# Patient Record
Sex: FEMALE | Race: BLACK OR AFRICAN AMERICAN | NOT HISPANIC OR LATINO | Employment: OTHER | URBAN - METROPOLITAN AREA
[De-identification: names, ages, dates, MRNs, and addresses within clinical notes are randomized per-mention and may not be internally consistent; named-entity substitution may affect disease eponyms.]

---

## 2023-02-11 PROBLEM — B02.9 SHINGLES: Status: ACTIVE | Noted: 2023-02-11

## 2023-02-11 PROBLEM — R60.0 PEDAL EDEMA: Status: ACTIVE | Noted: 2023-02-11

## 2023-02-11 PROBLEM — E66.01 OBESITY, MORBID (MORE THAN 100 LBS OVER IDEAL WEIGHT OR BMI > 40) (MULTI): Status: ACTIVE | Noted: 2023-02-11

## 2023-02-11 PROBLEM — M19.90 OSTEOARTHROSIS: Status: ACTIVE | Noted: 2023-02-11

## 2023-02-11 PROBLEM — E87.6 HYPOKALEMIA: Status: ACTIVE | Noted: 2023-02-11

## 2023-02-11 PROBLEM — D12.6 TUBULAR ADENOMA OF COLON: Status: ACTIVE | Noted: 2023-02-11

## 2023-02-11 PROBLEM — I10 HYPERTENSION: Status: ACTIVE | Noted: 2023-02-11

## 2023-02-11 PROBLEM — J30.2 SEASONAL ALLERGIES: Status: ACTIVE | Noted: 2023-02-11

## 2023-02-11 PROBLEM — M25.511 RIGHT SHOULDER PAIN: Status: ACTIVE | Noted: 2023-02-11

## 2023-02-11 PROBLEM — R47.01 EXPRESSIVE APHASIA: Status: ACTIVE | Noted: 2023-02-11

## 2023-02-11 PROBLEM — I63.9 CVA (CEREBROVASCULAR ACCIDENT) (MULTI): Status: ACTIVE | Noted: 2023-02-11

## 2023-02-11 PROBLEM — M75.100 TORN ROTATOR CUFF: Status: ACTIVE | Noted: 2023-02-11

## 2023-02-11 PROBLEM — R52 PAIN MANAGEMENT: Status: ACTIVE | Noted: 2023-02-11

## 2023-02-11 PROBLEM — E78.5 DYSLIPIDEMIA: Status: ACTIVE | Noted: 2023-02-11

## 2023-02-11 PROBLEM — R79.89 ABNORMAL TSH: Status: ACTIVE | Noted: 2023-02-11

## 2023-02-11 PROBLEM — I48.19 PERSISTENT ATRIAL FIBRILLATION (MULTI): Status: ACTIVE | Noted: 2023-02-11

## 2023-02-11 PROBLEM — H04.123 DRY EYES: Status: ACTIVE | Noted: 2023-02-11

## 2023-02-11 RX ORDER — ATORVASTATIN CALCIUM 80 MG/1
1 TABLET, FILM COATED ORAL NIGHTLY
COMMUNITY
Start: 2019-01-04 | End: 2023-03-10 | Stop reason: SDUPTHER

## 2023-02-11 RX ORDER — DILTIAZEM HYDROCHLORIDE 120 MG/1
1 CAPSULE, EXTENDED RELEASE ORAL DAILY
COMMUNITY
Start: 2020-10-06 | End: 2023-03-10 | Stop reason: SDUPTHER

## 2023-02-11 RX ORDER — MULTIVIT-MIN/FA/LYCOPEN/LUTEIN .4-300-25
1 TABLET ORAL DAILY
COMMUNITY

## 2023-02-11 RX ORDER — SPIRONOLACTONE 25 MG/1
1.5 TABLET ORAL DAILY
COMMUNITY
Start: 2020-03-12 | End: 2023-03-10 | Stop reason: SDUPTHER

## 2023-02-11 RX ORDER — CHOLECALCIFEROL (VITAMIN D3) 25 MCG
1 TABLET ORAL DAILY
COMMUNITY
Start: 2022-07-20

## 2023-03-04 LAB
ALANINE AMINOTRANSFERASE (SGPT) (U/L) IN SER/PLAS: 28 U/L (ref 7–45)
ALBUMIN (G/DL) IN SER/PLAS: 3.8 G/DL (ref 3.4–5)
ALKALINE PHOSPHATASE (U/L) IN SER/PLAS: 65 U/L (ref 33–136)
ANION GAP IN SER/PLAS: 12 MMOL/L (ref 10–20)
ASPARTATE AMINOTRANSFERASE (SGOT) (U/L) IN SER/PLAS: 23 U/L (ref 9–39)
BILIRUBIN TOTAL (MG/DL) IN SER/PLAS: 0.7 MG/DL (ref 0–1.2)
CALCIUM (MG/DL) IN SER/PLAS: 9.5 MG/DL (ref 8.6–10.3)
CARBON DIOXIDE, TOTAL (MMOL/L) IN SER/PLAS: 32 MMOL/L (ref 21–32)
CHLORIDE (MMOL/L) IN SER/PLAS: 99 MMOL/L (ref 98–107)
CHOLESTEROL (MG/DL) IN SER/PLAS: 134 MG/DL (ref 0–199)
CHOLESTEROL IN HDL (MG/DL) IN SER/PLAS: 44.3 MG/DL
CHOLESTEROL/HDL RATIO: 3
CREATININE (MG/DL) IN SER/PLAS: 1.03 MG/DL (ref 0.5–1.05)
ERYTHROCYTE DISTRIBUTION WIDTH (RATIO) BY AUTOMATED COUNT: 13.5 % (ref 11.5–14.5)
ERYTHROCYTE MEAN CORPUSCULAR HEMOGLOBIN CONCENTRATION (G/DL) BY AUTOMATED: 31.3 G/DL (ref 32–36)
ERYTHROCYTE MEAN CORPUSCULAR VOLUME (FL) BY AUTOMATED COUNT: 84 FL (ref 80–100)
ERYTHROCYTES (10*6/UL) IN BLOOD BY AUTOMATED COUNT: 5 X10E12/L (ref 4–5.2)
GFR FEMALE: 58 ML/MIN/1.73M2
GLUCOSE (MG/DL) IN SER/PLAS: 84 MG/DL (ref 74–99)
HEMATOCRIT (%) IN BLOOD BY AUTOMATED COUNT: 41.9 % (ref 36–46)
HEMOGLOBIN (G/DL) IN BLOOD: 13.1 G/DL (ref 12–16)
LDL: 78 MG/DL (ref 0–99)
LEUKOCYTES (10*3/UL) IN BLOOD BY AUTOMATED COUNT: 7.2 X10E9/L (ref 4.4–11.3)
PLATELETS (10*3/UL) IN BLOOD AUTOMATED COUNT: 321 X10E9/L (ref 150–450)
POTASSIUM (MMOL/L) IN SER/PLAS: 4.6 MMOL/L (ref 3.5–5.3)
PROTEIN TOTAL: 7.6 G/DL (ref 6.4–8.2)
SODIUM (MMOL/L) IN SER/PLAS: 138 MMOL/L (ref 136–145)
THYROTROPIN (MIU/L) IN SER/PLAS BY DETECTION LIMIT <= 0.05 MIU/L: 0.66 MIU/L (ref 0.44–3.98)
TRIGLYCERIDE (MG/DL) IN SER/PLAS: 57 MG/DL (ref 0–149)
UREA NITROGEN (MG/DL) IN SER/PLAS: 10 MG/DL (ref 6–23)
VLDL: 11 MG/DL (ref 0–40)

## 2023-03-10 ENCOUNTER — OFFICE VISIT (OUTPATIENT)
Dept: PRIMARY CARE | Facility: CLINIC | Age: 73
End: 2023-03-10
Payer: MEDICARE

## 2023-03-10 VITALS
TEMPERATURE: 98.3 F | WEIGHT: 203 LBS | DIASTOLIC BLOOD PRESSURE: 70 MMHG | RESPIRATION RATE: 16 BRPM | SYSTOLIC BLOOD PRESSURE: 130 MMHG | BODY MASS INDEX: 41 KG/M2 | HEART RATE: 72 BPM

## 2023-03-10 DIAGNOSIS — I63.9 CEREBROVASCULAR ACCIDENT (CVA), UNSPECIFIED MECHANISM (MULTI): Primary | ICD-10-CM

## 2023-03-10 DIAGNOSIS — E78.5 DYSLIPIDEMIA: ICD-10-CM

## 2023-03-10 DIAGNOSIS — E66.01 OBESITY, MORBID (MORE THAN 100 LBS OVER IDEAL WEIGHT OR BMI > 40) (MULTI): ICD-10-CM

## 2023-03-10 DIAGNOSIS — I10 PRIMARY HYPERTENSION: ICD-10-CM

## 2023-03-10 DIAGNOSIS — I48.19 PERSISTENT ATRIAL FIBRILLATION (MULTI): ICD-10-CM

## 2023-03-10 DIAGNOSIS — Z12.11 COLON CANCER SCREENING: ICD-10-CM

## 2023-03-10 PROCEDURE — 1159F MED LIST DOCD IN RCRD: CPT | Performed by: INTERNAL MEDICINE

## 2023-03-10 PROCEDURE — 3075F SYST BP GE 130 - 139MM HG: CPT | Performed by: INTERNAL MEDICINE

## 2023-03-10 PROCEDURE — 3078F DIAST BP <80 MM HG: CPT | Performed by: INTERNAL MEDICINE

## 2023-03-10 PROCEDURE — 1036F TOBACCO NON-USER: CPT | Performed by: INTERNAL MEDICINE

## 2023-03-10 PROCEDURE — 99214 OFFICE O/P EST MOD 30 MIN: CPT | Performed by: INTERNAL MEDICINE

## 2023-03-10 PROCEDURE — 1160F RVW MEDS BY RX/DR IN RCRD: CPT | Performed by: INTERNAL MEDICINE

## 2023-03-10 RX ORDER — DILTIAZEM HYDROCHLORIDE 120 MG/1
120 CAPSULE, EXTENDED RELEASE ORAL DAILY
Qty: 60 CAPSULE | Refills: 0 | Status: SHIPPED | OUTPATIENT
Start: 2023-03-10 | End: 2023-08-23 | Stop reason: SDUPTHER

## 2023-03-10 RX ORDER — ATORVASTATIN CALCIUM 80 MG/1
80 TABLET, FILM COATED ORAL NIGHTLY
Qty: 30 TABLET | Refills: 3 | Status: SHIPPED | OUTPATIENT
Start: 2023-03-10 | End: 2023-08-23 | Stop reason: SDUPTHER

## 2023-03-10 RX ORDER — POTASSIUM CHLORIDE 1500 MG/1
20 TABLET, EXTENDED RELEASE ORAL DAILY
Qty: 30 TABLET | Refills: 11 | Status: SHIPPED | OUTPATIENT
Start: 2023-03-10 | End: 2023-03-16 | Stop reason: SDUPTHER

## 2023-03-10 RX ORDER — SPIRONOLACTONE 25 MG/1
37.5 TABLET ORAL DAILY
Qty: 45 TABLET | Refills: 3 | Status: SHIPPED | OUTPATIENT
Start: 2023-03-10 | End: 2023-03-16 | Stop reason: DRUGHIGH

## 2023-03-10 ASSESSMENT — ENCOUNTER SYMPTOMS
ANAL BLEEDING: 0
DIZZINESS: 0
POLYPHAGIA: 0
ABDOMINAL PAIN: 0
ENDOCRINE NEGATIVE: 1
DIARRHEA: 0
RHINORRHEA: 0
CONFUSION: 0
JOINT SWELLING: 0
NAUSEA: 0
DIFFICULTY URINATING: 0
PALPITATIONS: 0
EYE DISCHARGE: 0
FLANK PAIN: 0
NEUROLOGICAL NEGATIVE: 1
CONSTITUTIONAL NEGATIVE: 1
BRUISES/BLEEDS EASILY: 0
COLOR CHANGE: 0
LIGHT-HEADEDNESS: 0
SINUS PRESSURE: 0
COUGH: 0
MUSCULOSKELETAL NEGATIVE: 1
EYES NEGATIVE: 1
STRIDOR: 0
SEIZURES: 0
GASTROINTESTINAL NEGATIVE: 1
FATIGUE: 0
ABDOMINAL DISTENTION: 0
NECK STIFFNESS: 0
BLOOD IN STOOL: 0
UNEXPECTED WEIGHT CHANGE: 0
APPETITE CHANGE: 0
PHOTOPHOBIA: 0
SORE THROAT: 0
MYALGIAS: 0
WEAKNESS: 0
HYPERACTIVE: 0
CHEST TIGHTNESS: 0
AGITATION: 0
HEMATOLOGIC/LYMPHATIC NEGATIVE: 1
TROUBLE SWALLOWING: 0
VOICE CHANGE: 0
SPEECH DIFFICULTY: 0
BACK PAIN: 0
PSYCHIATRIC NEGATIVE: 1
SLEEP DISTURBANCE: 0
CARDIOVASCULAR NEGATIVE: 1
RESPIRATORY NEGATIVE: 1
FREQUENCY: 0
TREMORS: 0
HEMATURIA: 0
APNEA: 0
RECTAL PAIN: 0
NECK PAIN: 0
CONSTIPATION: 0
DYSURIA: 0
SHORTNESS OF BREATH: 0
NUMBNESS: 0
WHEEZING: 0
ACTIVITY CHANGE: 0
POLYDIPSIA: 0
NERVOUS/ANXIOUS: 0
DYSPHORIC MOOD: 0
HEADACHES: 0
ALLERGIC/IMMUNOLOGIC NEGATIVE: 1

## 2023-03-10 NOTE — PROGRESS NOTES
Subjective   Patient ID: Elias Mayorga is a 72 y.o. female who presents for No chief complaint on file..    Patient with a past medical history of CVA, A fibrillation, HTN, HLD, Pedal edema,Shingles, Colonoscopy due 2023, Mammograms with [facility]    Feels fine  No chest pain/  SOB/ dizziness  BM OK  Energy level ok  Appetite OK             Review of Systems   Constitutional: Negative.  Negative for activity change, appetite change, fatigue and unexpected weight change.   HENT: Negative.  Negative for congestion, dental problem, ear discharge, ear pain, hearing loss, mouth sores, nosebleeds, postnasal drip, rhinorrhea, sinus pressure, sneezing, sore throat, tinnitus, trouble swallowing and voice change.    Eyes: Negative.  Negative for photophobia, discharge and visual disturbance.   Respiratory: Negative.  Negative for apnea, cough, chest tightness, shortness of breath, wheezing and stridor.    Cardiovascular: Negative.  Negative for chest pain, palpitations and leg swelling.   Gastrointestinal: Negative.  Negative for abdominal distention, abdominal pain, anal bleeding, blood in stool, constipation, diarrhea, nausea and rectal pain.   Endocrine: Negative.  Negative for cold intolerance, heat intolerance, polydipsia, polyphagia and polyuria.   Genitourinary: Negative.  Negative for decreased urine volume, difficulty urinating, dysuria, flank pain, frequency, hematuria and urgency.   Musculoskeletal: Negative.  Negative for back pain, gait problem, joint swelling, myalgias, neck pain and neck stiffness.   Skin: Negative.  Negative for color change and rash.   Allergic/Immunologic: Negative.  Negative for food allergies.   Neurological: Negative.  Negative for dizziness, tremors, seizures, syncope, speech difficulty, weakness, light-headedness, numbness and headaches.   Hematological: Negative.  Does not bruise/bleed easily.   Psychiatric/Behavioral: Negative.  Negative for agitation, confusion, dysphoric mood and  sleep disturbance. The patient is not nervous/anxious and is not hyperactive.        Objective   Temp 36.8 °C (98.3 °F) (Oral)   Wt 92.1 kg (203 lb)   BMI 41.00 kg/m²     Physical Exam  Vitals and nursing note reviewed.   Constitutional:       Appearance: Normal appearance. She is normal weight.   HENT:      Head: Normocephalic and atraumatic.      Right Ear: Tympanic membrane, ear canal and external ear normal.      Left Ear: Tympanic membrane, ear canal and external ear normal.      Nose: Nose normal. No congestion or rhinorrhea.      Mouth/Throat:      Mouth: Mucous membranes are dry.      Pharynx: Oropharynx is clear.   Eyes:      General: No scleral icterus.        Right eye: No discharge.         Left eye: No discharge.      Extraocular Movements: Extraocular movements intact.      Conjunctiva/sclera: Conjunctivae normal.      Pupils: Pupils are equal, round, and reactive to light.   Neck:      Vascular: No carotid bruit.   Cardiovascular:      Rate and Rhythm: Normal rate. Rhythm irregular.      Pulses: Normal pulses.      Heart sounds: Normal heart sounds. No murmur heard.     No friction rub. No gallop.   Pulmonary:      Effort: Pulmonary effort is normal. No respiratory distress.      Breath sounds: Normal breath sounds. No stridor. No wheezing, rhonchi or rales.   Chest:      Chest wall: No tenderness.   Abdominal:      General: Abdomen is flat. Bowel sounds are normal. There is no distension.      Palpations: Abdomen is soft. There is no mass.      Tenderness: There is no abdominal tenderness. There is no right CVA tenderness, left CVA tenderness, guarding or rebound.      Hernia: No hernia is present.   Genitourinary:     Rectum: Normal. Guaiac result negative.   Musculoskeletal:         General: No swelling, tenderness, deformity or signs of injury.      Cervical back: Normal range of motion and neck supple. No rigidity or tenderness.      Right lower leg: No edema.      Left lower leg: No edema.    Lymphadenopathy:      Cervical: No cervical adenopathy.   Skin:     Coloration: Skin is not jaundiced or pale.      Findings: No bruising, erythema, lesion or rash.   Neurological:      General: No focal deficit present.      Mental Status: She is alert and oriented to person, place, and time. Mental status is at baseline.      Cranial Nerves: No cranial nerve deficit.      Sensory: No sensory deficit.      Motor: No weakness.      Coordination: Coordination normal.      Gait: Gait normal.      Deep Tendon Reflexes: Reflexes normal.   Psychiatric:         Mood and Affect: Mood normal.         Behavior: Behavior normal.         Thought Content: Thought content normal.         Judgment: Judgment normal.         Assessment/Plan   Problem List Items Addressed This Visit          Nervous    CVA (cerebrovascular accident) (CMS/HCC) - Primary     Stable  Cont asa/ statins            Circulatory    Hypertension     Stable  Continue current medications  Watch salt, avoid excessive caffeine  Avoid processed meats/ sugars/juices Instead eat fresh fruit  Add walnuts and almonds to your diet  exercise 6 days a week for 30 minutes           Persistent atrial fibrillation (CMS/HCC)     stable            Endocrine/Metabolic    Obesity, morbid (more than 100 lbs over ideal weight or BMI > 40) (CMS/HCC)     1600 zulay diet  Daily 30 minutes walk            Other    Dyslipidemia     Target LDL < 70

## 2023-03-10 NOTE — PROGRESS NOTES
Subjective   Patient ID: Elias Mayorga is a 72 y.o. female who presents for No chief complaint on file..    HPI     Review of Systems    Objective   /70   Pulse 72   Temp 36.8 °C (98.3 °F) (Oral)   Resp 16   Wt 92.1 kg (203 lb)   BMI 41.00 kg/m²     Physical Exam    Assessment/Plan   Problem List Items Addressed This Visit          Nervous    CVA (cerebrovascular accident) (CMS/HCC) - Primary     Stable  Cont asa/ statins              Circulatory    Hypertension     Stable  Continue current medications  Watch salt, avoid excessive caffeine  Avoid processed meats/ sugars/juices Instead eat fresh fruit  Add walnuts and almonds to your diet  exercise 6 days a week for 30 minutes           Relevant Medications    spironolactone (Aldactone) 25 mg tablet    potassium chloride CR (K-Tab) 20 mEq ER tablet    Persistent atrial fibrillation (CMS/HCC)     stable         Relevant Medications    apixaban (Eliquis) 5 mg tablet    dilTIAZem XR (DILT-XR) 120 mg 24 hr capsule       Endocrine/Metabolic    Obesity, morbid (more than 100 lbs over ideal weight or BMI > 40) (CMS/HCC)     1600 zulay diet  Daily 30 minutes walk            Other    Dyslipidemia     Target LDL < 70         Relevant Medications    atorvastatin (Lipitor) 80 mg tablet     Other Visit Diagnoses       Colon cancer screening        Relevant Orders    Colonoscopy

## 2023-03-16 ENCOUNTER — TELEPHONE (OUTPATIENT)
Dept: PRIMARY CARE | Facility: CLINIC | Age: 73
End: 2023-03-16
Payer: MEDICARE

## 2023-03-16 DIAGNOSIS — I10 HTN (HYPERTENSION), BENIGN: Primary | ICD-10-CM

## 2023-03-16 DIAGNOSIS — I10 PRIMARY HYPERTENSION: ICD-10-CM

## 2023-03-16 RX ORDER — SPIRONOLACTONE 25 MG/1
25 TABLET ORAL DAILY
Qty: 45 TABLET | Refills: 3 | Status: SHIPPED | OUTPATIENT
Start: 2023-03-16 | End: 2023-08-23 | Stop reason: SDUPTHER

## 2023-03-16 RX ORDER — POTASSIUM CHLORIDE 1500 MG/1
20 TABLET, EXTENDED RELEASE ORAL 2 TIMES DAILY
Qty: 60 TABLET | Refills: 11 | Status: ON HOLD | OUTPATIENT
Start: 2023-03-16 | End: 2023-10-31

## 2023-03-16 RX ORDER — DILTIAZEM HYDROCHLORIDE 180 MG/1
180 CAPSULE, EXTENDED RELEASE ORAL DAILY
Qty: 30 CAPSULE | Refills: 11 | Status: SHIPPED | OUTPATIENT
Start: 2023-03-16 | End: 2023-08-23 | Stop reason: SDUPTHER

## 2023-03-16 NOTE — TELEPHONE ENCOUNTER
PLEASE call patient she would like to speak with you regarding a couple of her medications that you ordered on her last visit potassium suppose to be twice a day and her dilt xr 180 and 120   [phone]

## 2023-03-24 ENCOUNTER — LAB (OUTPATIENT)
Dept: LAB | Facility: LAB | Age: 73
End: 2023-03-24
Payer: MEDICARE

## 2023-03-24 DIAGNOSIS — I10 HTN (HYPERTENSION), BENIGN: ICD-10-CM

## 2023-03-24 LAB
ANION GAP IN SER/PLAS: 12 MMOL/L (ref 10–20)
CALCIUM (MG/DL) IN SER/PLAS: 9.2 MG/DL (ref 8.6–10.3)
CARBON DIOXIDE, TOTAL (MMOL/L) IN SER/PLAS: 30 MMOL/L (ref 21–32)
CHLORIDE (MMOL/L) IN SER/PLAS: 102 MMOL/L (ref 98–107)
CREATININE (MG/DL) IN SER/PLAS: 1.05 MG/DL (ref 0.5–1.05)
GFR FEMALE: 57 ML/MIN/1.73M2
GLUCOSE (MG/DL) IN SER/PLAS: 120 MG/DL (ref 74–99)
POTASSIUM (MMOL/L) IN SER/PLAS: 4.5 MMOL/L (ref 3.5–5.3)
SODIUM (MMOL/L) IN SER/PLAS: 139 MMOL/L (ref 136–145)
UREA NITROGEN (MG/DL) IN SER/PLAS: 14 MG/DL (ref 6–23)

## 2023-03-24 PROCEDURE — 36415 COLL VENOUS BLD VENIPUNCTURE: CPT

## 2023-03-24 PROCEDURE — 80048 BASIC METABOLIC PNL TOTAL CA: CPT

## 2023-06-13 ENCOUNTER — APPOINTMENT (OUTPATIENT)
Dept: PRIMARY CARE | Facility: CLINIC | Age: 73
End: 2023-06-13
Payer: MEDICARE

## 2023-08-16 ENCOUNTER — APPOINTMENT (OUTPATIENT)
Dept: PRIMARY CARE | Facility: CLINIC | Age: 73
End: 2023-08-16
Payer: MEDICARE

## 2023-08-17 ENCOUNTER — APPOINTMENT (OUTPATIENT)
Dept: PRIMARY CARE | Facility: CLINIC | Age: 73
End: 2023-08-17
Payer: MEDICARE

## 2023-08-23 ENCOUNTER — OFFICE VISIT (OUTPATIENT)
Dept: PRIMARY CARE | Facility: CLINIC | Age: 73
End: 2023-08-23
Payer: MEDICARE

## 2023-08-23 VITALS
SYSTOLIC BLOOD PRESSURE: 130 MMHG | TEMPERATURE: 98.1 F | WEIGHT: 202.6 LBS | DIASTOLIC BLOOD PRESSURE: 80 MMHG | BODY MASS INDEX: 40.92 KG/M2 | RESPIRATION RATE: 16 BRPM | HEART RATE: 70 BPM

## 2023-08-23 DIAGNOSIS — Z12.11 COLON CANCER SCREENING: ICD-10-CM

## 2023-08-23 DIAGNOSIS — E78.5 DYSLIPIDEMIA: Primary | ICD-10-CM

## 2023-08-23 DIAGNOSIS — I10 PRIMARY HYPERTENSION: ICD-10-CM

## 2023-08-23 DIAGNOSIS — I10 HTN (HYPERTENSION), BENIGN: ICD-10-CM

## 2023-08-23 DIAGNOSIS — I48.19 PERSISTENT ATRIAL FIBRILLATION (MULTI): ICD-10-CM

## 2023-08-23 LAB
POC HDL CHOLESTEROL: 36 MG/DL (ref 0–50)
POC LDL CHOLESTEROL: 0 MG/DL (ref 0–100)
POC NON-HDL CHOLESTEROL: 99 MG/DL (ref 0–130)
POC TOTAL CHOLESTEROL/HDL RATIO: 3.7 (ref 0–4.5)
POC TOTAL CHOLESTEROL: 135 MG/DL (ref 0–199)
POC TRIGLYCERIDES: 45 MG/DL (ref 0–150)

## 2023-08-23 PROCEDURE — 1160F RVW MEDS BY RX/DR IN RCRD: CPT | Performed by: INTERNAL MEDICINE

## 2023-08-23 PROCEDURE — 80061 LIPID PANEL: CPT | Performed by: INTERNAL MEDICINE

## 2023-08-23 PROCEDURE — 1125F AMNT PAIN NOTED PAIN PRSNT: CPT | Performed by: INTERNAL MEDICINE

## 2023-08-23 PROCEDURE — 1036F TOBACCO NON-USER: CPT | Performed by: INTERNAL MEDICINE

## 2023-08-23 PROCEDURE — 99214 OFFICE O/P EST MOD 30 MIN: CPT | Performed by: INTERNAL MEDICINE

## 2023-08-23 PROCEDURE — 1159F MED LIST DOCD IN RCRD: CPT | Performed by: INTERNAL MEDICINE

## 2023-08-23 PROCEDURE — 3075F SYST BP GE 130 - 139MM HG: CPT | Performed by: INTERNAL MEDICINE

## 2023-08-23 PROCEDURE — 3079F DIAST BP 80-89 MM HG: CPT | Performed by: INTERNAL MEDICINE

## 2023-08-23 RX ORDER — DILTIAZEM HYDROCHLORIDE 120 MG/1
120 CAPSULE, EXTENDED RELEASE ORAL DAILY
Qty: 60 CAPSULE | Refills: 0 | Status: SHIPPED | OUTPATIENT
Start: 2023-08-23 | End: 2023-12-07 | Stop reason: SDUPTHER

## 2023-08-23 RX ORDER — DILTIAZEM HYDROCHLORIDE 180 MG/1
180 CAPSULE, EXTENDED RELEASE ORAL DAILY
Qty: 30 CAPSULE | Refills: 11 | Status: SHIPPED | OUTPATIENT
Start: 2023-08-23 | End: 2023-10-16 | Stop reason: SDUPTHER

## 2023-08-23 RX ORDER — SPIRONOLACTONE 25 MG/1
25 TABLET ORAL DAILY
Qty: 45 TABLET | Refills: 3 | Status: ON HOLD | OUTPATIENT
Start: 2023-08-23 | End: 2023-11-01 | Stop reason: SDUPTHER

## 2023-08-23 RX ORDER — ATORVASTATIN CALCIUM 80 MG/1
80 TABLET, FILM COATED ORAL NIGHTLY
Qty: 30 TABLET | Refills: 3 | Status: SHIPPED | OUTPATIENT
Start: 2023-08-23 | End: 2023-10-10 | Stop reason: SDUPTHER

## 2023-08-23 ASSESSMENT — ENCOUNTER SYMPTOMS
OCCASIONAL FEELINGS OF UNSTEADINESS: 0
LOSS OF SENSATION IN FEET: 0
DEPRESSION: 0

## 2023-08-23 NOTE — PROGRESS NOTES
Elias Mayorga is a 72 y.o. female   Patient with a past medical history of CVA, A fibrillation, HTN, HLD, Pedal edema, CKD III, Shingles, Colonoscopy due 2023, Mammograms with [facility]      Mother passed at 94    Feels fine  No chest pain/  SOB/ dizziness  BM OK  Energy level ok  Appetite OK             Review of Systems     Constitutional: no fever, no chills, not feeling poorly, not feeling tired and no recent weight gain, no recent weight loss.   ENT: no earache, no hearing loss, no nosebleeds, no nasal discharge, no sore throat and no hoarseness.   Cardiovascular: the heart rate was not slow, the heart rate was not fast, no chest pain, no palpitations, no intermittent leg claudication edema  Respiratory: no cough, wheezing or shortness of breath at rest or exertion  Gastrointestinal: no abdominal pain, no constipation, no melena, no nausea, no diarrhea, no vomiting and no blood in stools.   Musculoskeletal: no arthralgias, no myalgias, no back pain, no joint swelling, no joint stiffness, no limb pain and no limb swelling.   Integumentary: no skin rashes, no skin lesions, no itching, no skin wound and no dry skin.   Neurological: no headache, no confusion, no numbness, no dizziness, no tingling and no fainting.   All other systems have been reviewed and are negative for complaint.       Vitals:    08/23/23 1051   Temp: 36.7 °C (98.1 °F)        Physical Exam     Constitutional   General appearance: Alert and in no acute distress.     Pulmonary   Respiratory assessment: No respiratory distress, normal respiratory rhythm and effort.    Auscultation of Lungs: Clear bilateral breath sounds.   Cardiovascular   Auscultation of heart: Apical pulse normal, heart rate and rhythm normal, normal S1 and S2, no murmurs and no pericardial rub.    Exam for edema: Plus peripheral edema.   Abdomen   Abdominal Exam: No bruits, normal bowel sounds, soft, non-tender, no abdominal mass palpated.    Liver and Spleen exam: No  hepato-splenomegaly.   Musculoskeletal   Examination of gait: Normal.    Inspection of digits and nails: No clubbing or cyanosis of the fingernails.    Inspection/palpation of joints, bones and muscles: No joint swelling. Normal movement of all extremities.   Skin   Skin inspection: Normal skin color and pigmentation, normal skin turgor and no visible rash.   Neurologic   Cranial nerves: Nerves 2-12 were intact, no focal neuro defects.     Assessment/Plan          Patient with a past medical history of CVA, A fibrillation, HTN, HLD, Pedal edema,Shingles, CKD III, Colonoscopy due 2023, Mammograms with [facility]     # HTN/ CKD III  Stable  Continue current medications    # Afib  Stable    # HLD  Stable  Continue current medications  Watch salt, avoid excessive caffeine  Avoid processed meats/ sugars/juices Instead eat fresh fruit  Add walnuts and almonds to your diet  exercise 6 days a week for 30 minutes    Colonoscopy scheduled

## 2023-10-10 DIAGNOSIS — E78.5 DYSLIPIDEMIA: ICD-10-CM

## 2023-10-10 RX ORDER — ATORVASTATIN CALCIUM 80 MG/1
80 TABLET, FILM COATED ORAL NIGHTLY
Qty: 90 TABLET | Refills: 0 | Status: SHIPPED | OUTPATIENT
Start: 2023-10-10 | End: 2023-10-12 | Stop reason: SDUPTHER

## 2023-10-12 DIAGNOSIS — E78.5 DYSLIPIDEMIA: ICD-10-CM

## 2023-10-12 RX ORDER — ATORVASTATIN CALCIUM 80 MG/1
80 TABLET, FILM COATED ORAL NIGHTLY
Qty: 90 TABLET | Refills: 0 | Status: SHIPPED | OUTPATIENT
Start: 2023-10-12 | End: 2024-01-04 | Stop reason: SDUPTHER

## 2023-10-16 DIAGNOSIS — I48.19 PERSISTENT ATRIAL FIBRILLATION (MULTI): ICD-10-CM

## 2023-10-16 DIAGNOSIS — I10 HTN (HYPERTENSION), BENIGN: ICD-10-CM

## 2023-10-16 RX ORDER — DILTIAZEM HYDROCHLORIDE 180 MG/1
180 CAPSULE, EXTENDED RELEASE ORAL DAILY
Qty: 90 CAPSULE | Refills: 0 | Status: SHIPPED | OUTPATIENT
Start: 2023-10-16 | End: 2024-01-04 | Stop reason: SDUPTHER

## 2023-10-16 NOTE — TELEPHONE ENCOUNTER
PT called in and stated that all her scripts should be for 90 days. PT also stated that she needs a refill on RX: DILTIAZEM XR. Please advise

## 2023-10-24 ENCOUNTER — TELEPHONE (OUTPATIENT)
Dept: PRIMARY CARE | Facility: CLINIC | Age: 73
End: 2023-10-24
Payer: MEDICARE

## 2023-10-24 NOTE — TELEPHONE ENCOUNTER
PT has a colonoscopy next tue at 215 and would like to know should she stop taking blood thinners and if so when please advise                PLEASE PEND TO DR ROBLERO

## 2023-10-24 NOTE — TELEPHONE ENCOUNTER
PT called in and stated that she needs clarification on her dosage for her RX: Spironolactone 25 mg tab PT stated that she used to taks half of the pill and now the script says to take 1 pill daily. PT stated that she does not remember   say he was changing the dosage                PLEASE PEND TO DR ROBLERO

## 2023-10-31 ENCOUNTER — HOSPITAL ENCOUNTER (OUTPATIENT)
Dept: GASTROENTEROLOGY | Facility: HOSPITAL | Age: 73
Discharge: HOME | End: 2023-10-31
Payer: MEDICARE

## 2023-10-31 ENCOUNTER — APPOINTMENT (OUTPATIENT)
Dept: RADIOLOGY | Facility: HOSPITAL | Age: 73
End: 2023-10-31
Payer: MEDICARE

## 2023-10-31 ENCOUNTER — HOSPITAL ENCOUNTER (OUTPATIENT)
Facility: HOSPITAL | Age: 73
Setting detail: OBSERVATION
Discharge: HOME | End: 2023-11-02
Attending: STUDENT IN AN ORGANIZED HEALTH CARE EDUCATION/TRAINING PROGRAM | Admitting: INTERNAL MEDICINE
Payer: MEDICARE

## 2023-10-31 VITALS — BODY MASS INDEX: 40.84 KG/M2 | HEIGHT: 59 IN | TEMPERATURE: 97.3 F | RESPIRATION RATE: 16 BRPM | WEIGHT: 202.6 LBS

## 2023-10-31 DIAGNOSIS — I10 PRIMARY HYPERTENSION: ICD-10-CM

## 2023-10-31 DIAGNOSIS — I48.91 ATRIAL FIBRILLATION WITH RVR (MULTI): Primary | ICD-10-CM

## 2023-10-31 DIAGNOSIS — I48.91 ATRIAL FIBRILLATION WITH RAPID VENTRICULAR RESPONSE (MULTI): ICD-10-CM

## 2023-10-31 DIAGNOSIS — Z12.11 COLON CANCER SCREENING: ICD-10-CM

## 2023-10-31 DIAGNOSIS — I48.20 CHRONIC A-FIB (MULTI): ICD-10-CM

## 2023-10-31 LAB
ALBUMIN SERPL BCP-MCNC: 4 G/DL (ref 3.4–5)
ALP SERPL-CCNC: 45 U/L (ref 33–136)
ALT SERPL W P-5'-P-CCNC: 21 U/L (ref 7–45)
ANION GAP SERPL CALC-SCNC: 13 MMOL/L (ref 10–20)
AST SERPL W P-5'-P-CCNC: 44 U/L (ref 9–39)
BASOPHILS # BLD AUTO: 0.07 X10*3/UL (ref 0–0.1)
BASOPHILS NFR BLD AUTO: 1 %
BILIRUB SERPL-MCNC: 0.7 MG/DL (ref 0–1.2)
BUN SERPL-MCNC: 8 MG/DL (ref 6–23)
CALCIUM SERPL-MCNC: 9.5 MG/DL (ref 8.6–10.3)
CARDIAC TROPONIN I PNL SERPL HS: 3 NG/L (ref 0–13)
CARDIAC TROPONIN I PNL SERPL HS: 3 NG/L (ref 0–13)
CHLORIDE SERPL-SCNC: 100 MMOL/L (ref 98–107)
CO2 SERPL-SCNC: 28 MMOL/L (ref 21–32)
CREAT SERPL-MCNC: 0.93 MG/DL (ref 0.5–1.05)
EOSINOPHIL # BLD AUTO: 0.14 X10*3/UL (ref 0–0.4)
EOSINOPHIL NFR BLD AUTO: 1.9 %
ERYTHROCYTE [DISTWIDTH] IN BLOOD BY AUTOMATED COUNT: 13.7 % (ref 11.5–14.5)
GFR SERPL CREATININE-BSD FRML MDRD: 66 ML/MIN/1.73M*2
GLUCOSE SERPL-MCNC: 84 MG/DL (ref 74–99)
HCT VFR BLD AUTO: 38.9 % (ref 36–46)
HGB BLD-MCNC: 12.5 G/DL (ref 12–16)
IMM GRANULOCYTES # BLD AUTO: 0.01 X10*3/UL (ref 0–0.5)
IMM GRANULOCYTES NFR BLD AUTO: 0.1 % (ref 0–0.9)
LYMPHOCYTES # BLD AUTO: 3.01 X10*3/UL (ref 0.8–3)
LYMPHOCYTES NFR BLD AUTO: 41.8 %
MAGNESIUM SERPL-MCNC: 1.6 MG/DL (ref 1.6–2.4)
MCH RBC QN AUTO: 26.6 PG (ref 26–34)
MCHC RBC AUTO-ENTMCNC: 32.1 G/DL (ref 32–36)
MCV RBC AUTO: 83 FL (ref 80–100)
MONOCYTES # BLD AUTO: 0.56 X10*3/UL (ref 0.05–0.8)
MONOCYTES NFR BLD AUTO: 7.8 %
NEUTROPHILS # BLD AUTO: 3.41 X10*3/UL (ref 1.6–5.5)
NEUTROPHILS NFR BLD AUTO: 47.4 %
NRBC BLD-RTO: 0 /100 WBCS (ref 0–0)
PLATELET # BLD AUTO: 308 X10*3/UL (ref 150–450)
PMV BLD AUTO: 10.1 FL (ref 7.5–11.5)
POTASSIUM SERPL-SCNC: 5.4 MMOL/L (ref 3.5–5.3)
PROT SERPL-MCNC: 8.3 G/DL (ref 6.4–8.2)
RBC # BLD AUTO: 4.7 X10*6/UL (ref 4–5.2)
SODIUM SERPL-SCNC: 136 MMOL/L (ref 136–145)
WBC # BLD AUTO: 7.2 X10*3/UL (ref 4.4–11.3)

## 2023-10-31 PROCEDURE — 71045 X-RAY EXAM CHEST 1 VIEW: CPT | Mod: FY

## 2023-10-31 PROCEDURE — 93010 ELECTROCARDIOGRAM REPORT: CPT | Performed by: INTERNAL MEDICINE

## 2023-10-31 PROCEDURE — G0378 HOSPITAL OBSERVATION PER HR: HCPCS

## 2023-10-31 PROCEDURE — 36415 COLL VENOUS BLD VENIPUNCTURE: CPT | Performed by: STUDENT IN AN ORGANIZED HEALTH CARE EDUCATION/TRAINING PROGRAM

## 2023-10-31 PROCEDURE — 85025 COMPLETE CBC W/AUTO DIFF WBC: CPT | Performed by: STUDENT IN AN ORGANIZED HEALTH CARE EDUCATION/TRAINING PROGRAM

## 2023-10-31 PROCEDURE — 80053 COMPREHEN METABOLIC PANEL: CPT | Performed by: STUDENT IN AN ORGANIZED HEALTH CARE EDUCATION/TRAINING PROGRAM

## 2023-10-31 PROCEDURE — 83735 ASSAY OF MAGNESIUM: CPT | Performed by: STUDENT IN AN ORGANIZED HEALTH CARE EDUCATION/TRAINING PROGRAM

## 2023-10-31 PROCEDURE — 84484 ASSAY OF TROPONIN QUANT: CPT | Performed by: STUDENT IN AN ORGANIZED HEALTH CARE EDUCATION/TRAINING PROGRAM

## 2023-10-31 PROCEDURE — 2500000005 HC RX 250 GENERAL PHARMACY W/O HCPCS: Performed by: STUDENT IN AN ORGANIZED HEALTH CARE EDUCATION/TRAINING PROGRAM

## 2023-10-31 PROCEDURE — 96375 TX/PRO/DX INJ NEW DRUG ADDON: CPT

## 2023-10-31 PROCEDURE — 71045 X-RAY EXAM CHEST 1 VIEW: CPT | Performed by: RADIOLOGY

## 2023-10-31 PROCEDURE — 2500000004 HC RX 250 GENERAL PHARMACY W/ HCPCS (ALT 636 FOR OP/ED): Performed by: STUDENT IN AN ORGANIZED HEALTH CARE EDUCATION/TRAINING PROGRAM

## 2023-10-31 PROCEDURE — 99285 EMERGENCY DEPT VISIT HI MDM: CPT | Mod: 25 | Performed by: STUDENT IN AN ORGANIZED HEALTH CARE EDUCATION/TRAINING PROGRAM

## 2023-10-31 RX ORDER — TALC
3 POWDER (GRAM) TOPICAL NIGHTLY
Status: DISCONTINUED | OUTPATIENT
Start: 2023-10-31 | End: 2023-11-02 | Stop reason: HOSPADM

## 2023-10-31 RX ORDER — ASPIRIN 81 MG/1
81 TABLET ORAL DAILY
Status: DISCONTINUED | OUTPATIENT
Start: 2023-11-01 | End: 2023-11-02 | Stop reason: HOSPADM

## 2023-10-31 RX ORDER — ATORVASTATIN CALCIUM 80 MG/1
80 TABLET, FILM COATED ORAL NIGHTLY
Status: DISCONTINUED | OUTPATIENT
Start: 2023-10-31 | End: 2023-11-02 | Stop reason: HOSPADM

## 2023-10-31 RX ORDER — ACETAMINOPHEN 325 MG/1
975 TABLET ORAL EVERY 6 HOURS PRN
Status: DISCONTINUED | OUTPATIENT
Start: 2023-10-31 | End: 2023-11-02 | Stop reason: HOSPADM

## 2023-10-31 RX ORDER — DILTIAZEM HYDROCHLORIDE 5 MG/ML
20 INJECTION INTRAVENOUS ONCE
Status: COMPLETED | OUTPATIENT
Start: 2023-10-31 | End: 2023-10-31

## 2023-10-31 RX ADMIN — SODIUM CHLORIDE 1000 ML: 9 INJECTION, SOLUTION INTRAVENOUS at 16:21

## 2023-10-31 RX ADMIN — DILTIAZEM HYDROCHLORIDE 20 MG: 5 INJECTION INTRAVENOUS at 15:37

## 2023-10-31 SDOH — SOCIAL STABILITY: SOCIAL INSECURITY: DO YOU FEEL UNSAFE GOING BACK TO THE PLACE WHERE YOU ARE LIVING?: NO

## 2023-10-31 SDOH — SOCIAL STABILITY: SOCIAL INSECURITY: DO YOU FEEL ANYONE HAS EXPLOITED OR TAKEN ADVANTAGE OF YOU FINANCIALLY OR OF YOUR PERSONAL PROPERTY?: NO

## 2023-10-31 SDOH — SOCIAL STABILITY: SOCIAL INSECURITY: DOES ANYONE TRY TO KEEP YOU FROM HAVING/CONTACTING OTHER FRIENDS OR DOING THINGS OUTSIDE YOUR HOME?: NO

## 2023-10-31 SDOH — SOCIAL STABILITY: SOCIAL INSECURITY: WERE YOU ABLE TO COMPLETE ALL THE BEHAVIORAL HEALTH SCREENINGS?: YES

## 2023-10-31 SDOH — SOCIAL STABILITY: SOCIAL INSECURITY: HAVE YOU HAD THOUGHTS OF HARMING ANYONE ELSE?: NO

## 2023-10-31 SDOH — SOCIAL STABILITY: SOCIAL INSECURITY: HAS ANYONE EVER THREATENED TO HURT YOUR FAMILY OR YOUR PETS?: NO

## 2023-10-31 SDOH — SOCIAL STABILITY: SOCIAL INSECURITY: ABUSE: ADULT

## 2023-10-31 SDOH — SOCIAL STABILITY: SOCIAL INSECURITY: ARE THERE ANY APPARENT SIGNS OF INJURIES/BEHAVIORS THAT COULD BE RELATED TO ABUSE/NEGLECT?: NO

## 2023-10-31 SDOH — SOCIAL STABILITY: SOCIAL INSECURITY: ARE YOU OR HAVE YOU BEEN THREATENED OR ABUSED PHYSICALLY, EMOTIONALLY, OR SEXUALLY BY ANYONE?: NO

## 2023-10-31 ASSESSMENT — COLUMBIA-SUICIDE SEVERITY RATING SCALE - C-SSRS
1. IN THE PAST MONTH, HAVE YOU WISHED YOU WERE DEAD OR WISHED YOU COULD GO TO SLEEP AND NOT WAKE UP?: NO
6. HAVE YOU EVER DONE ANYTHING, STARTED TO DO ANYTHING, OR PREPARED TO DO ANYTHING TO END YOUR LIFE?: NO
2. HAVE YOU ACTUALLY HAD ANY THOUGHTS OF KILLING YOURSELF?: NO

## 2023-10-31 ASSESSMENT — LIFESTYLE VARIABLES
PRESCIPTION_ABUSE_PAST_12_MONTHS: NO
HOW OFTEN DO YOU HAVE 6 OR MORE DRINKS ON ONE OCCASION: NEVER
AUDIT-C TOTAL SCORE: 0
HOW OFTEN DO YOU HAVE A DRINK CONTAINING ALCOHOL: NEVER
SUBSTANCE_ABUSE_PAST_12_MONTHS: NO
HOW MANY STANDARD DRINKS CONTAINING ALCOHOL DO YOU HAVE ON A TYPICAL DAY: PATIENT DOES NOT DRINK
AUDIT-C TOTAL SCORE: 0
SKIP TO QUESTIONS 9-10: 1

## 2023-10-31 ASSESSMENT — ACTIVITIES OF DAILY LIVING (ADL)
FEEDING YOURSELF: INDEPENDENT
ASSISTIVE_DEVICE: EYEGLASSES
HEARING - RIGHT EAR: FUNCTIONAL
GROOMING: INDEPENDENT
JUDGMENT_ADEQUATE_SAFELY_COMPLETE_DAILY_ACTIVITIES: YES
BATHING: INDEPENDENT
LACK_OF_TRANSPORTATION: NO
ADEQUATE_TO_COMPLETE_ADL: YES
TOILETING: INDEPENDENT
WALKS IN HOME: INDEPENDENT
HEARING - LEFT EAR: FUNCTIONAL
DRESSING YOURSELF: INDEPENDENT
PATIENT'S MEMORY ADEQUATE TO SAFELY COMPLETE DAILY ACTIVITIES?: YES

## 2023-10-31 ASSESSMENT — COGNITIVE AND FUNCTIONAL STATUS - GENERAL
MOBILITY SCORE: 24
DAILY ACTIVITIY SCORE: 24
PATIENT BASELINE BEDBOUND: NO

## 2023-10-31 ASSESSMENT — PAIN SCALES - GENERAL
PAINLEVEL_OUTOF10: 0 - NO PAIN

## 2023-10-31 ASSESSMENT — PATIENT HEALTH QUESTIONNAIRE - PHQ9
SUM OF ALL RESPONSES TO PHQ9 QUESTIONS 1 & 2: 0
2. FEELING DOWN, DEPRESSED OR HOPELESS: NOT AT ALL
1. LITTLE INTEREST OR PLEASURE IN DOING THINGS: NOT AT ALL

## 2023-10-31 ASSESSMENT — PAIN - FUNCTIONAL ASSESSMENT
PAIN_FUNCTIONAL_ASSESSMENT: 0-10

## 2023-10-31 NOTE — PERIOPERATIVE NURSING NOTE
1330: Patient heart rate 120-139, Dr. Jennings notified. ECG obtained revealing a fib RVR. Dr. Jennings cartside discussing situation with patient.      1350: Dr. Jennings advised patient to go to ED for further evaluation.    1410: Patient safely transported to ED on cardiac monitoring assisted by two RN's.

## 2023-10-31 NOTE — ED PROVIDER NOTES
EMERGENCY MEDICINE EVALUATION NOTE    History of Present Illness     Chief Complaint:   Chief Complaint   Patient presents with    Atrial Fibrillation     Pt. Was sent down from PACU she was scheduled for a colonoscopy during assessment she was in AFIB RVR she was sent down for further assessment       HPI: Elias Mayorga is a 73 y.o. female with past medical history of atrial fibrillation on Eliquis, hypertension, CVA, and hyperlipidemia who presents with complaint of atrial fibrillation.  Patient states she was in the hospital to receive a colonoscopy for routine evaluation.  She states was noted at that time to be in atrial fibrillation with RVR with a heart rate in the 120s.  She states that she talk to her primary care provider Dr. Sutherland who told her to come the emergency room to be admitted for observation.  She states she does take diltiazem at home and has been compliant with this as well as her Eliquis.  Denies any other symptoms such as chest pain, palpitations, shortness of breath, fever, chills, cough.    Previous History     Past Medical History:   Diagnosis Date    Arthritis     Essential (primary) hypertension 11/11/2022    Hypertension    Hyperkalemia 12/01/2013    Hyperkalemia    Hyperlipidemia     Other conditions influencing health status 12/01/2013    Osteoarthritis Of Multiple Sites    Other persistent atrial fibrillation (CMS/HCC) 01/04/2019    Persistent atrial fibrillation    Stroke (CMS/HCC)     5 years ago     Past Surgical History:   Procedure Laterality Date    CT ANGIO CORONARY ART WITH HEARTFLOW IF SCORE >30%  9/24/2020    CT HEART CORONARY ANGIOGRAM 9/24/2020 [facility]    MR HEAD ANGIO WO IV CONTRAST  12/28/2018    MR HEAD ANGIO WO IV CONTRAST 12/28/2018 [facility]ICAL [facility]GAC[facility]    MR NECK ANGIO WO IV CONTRAST  12/28/2018    MR NECK ANGIO WO IV CONTRAST 12/28/2018 [facility] CL[facility]ICAL [facility]    TUBAL LIGATION  07/18/2014    Tubal Ligation     Social History     Tobacco Use     Smoking status: Never    Smokeless tobacco: Never   Substance Use Topics    Alcohol use: Never    Drug use: Never     Family History   Problem Relation Name Age of Onset    Hypertension Mother      Hypertension Father      Cancer Father      Other (gastric cancer) Father       Allergies   Allergen Reactions    Penicillins Hives     Current Outpatient Medications   Medication Instructions    apixaban (ELIQUIS) 5 mg, oral, Every 12 hours    atorvastatin (LIPITOR) 80 mg, oral, Nightly    CALCIUM CARBONATE-VITAMIN D3 ORAL TAKE AS DIRECTED.    cholecalciferol (Vitamin D-3) 25 MCG (1000 UT) tablet 1 tablet, oral, Daily    dilTIAZem XR (DILACOR XR) 180 mg, oral, Daily    dilTIAZem XR (DILT-XR) 120 mg, oral, Daily    multivit-iron-minerals-folic acid (Centrum Silver) 0.4 mg-300 mcg- 250 mcg tab 1 tablet, oral, Daily    potassium chloride CR (K-Tab) 20 mEq ER tablet 20 mEq, oral, 2 times daily, Do not crush, chew, or split.    spironolactone (ALDACTONE) 25 mg, oral, Daily       Physical Exam     Appearance: Alert, oriented , cooperative,  in mild acute distress. Well nourished & well hydrated.     Skin: Intact,  dry skin, no lesions, rash, petechiae or purpura.      Eyes: PERRLA, EOMs intact,  Conjunctiva pink with no redness or exudates. Cornea & anterior chamber are clear, Eyelids without lesions. No scleral icterus.      ENT: Hearing grossly intact. External auditory canals patent, tympanic membranes intact with visible landmarks. Nares patent, mucus membranes moist. Dentition without lesions. Pharynx clear, uvula midline.      Neck: Supple, without meningismus. Thyroid not palpable. Trachea at midline. No lymphadenopathy.     Pulmonary: Clear bilaterally with good chest wall excursion. No rales, rhonchi or wheezing. No accessory muscle use or stridor.     Cardiac: Irregularly irregular rate and rhythm without murmur, rub, gallop or extrasystole. No JVD, Carotids without bruits.     Abdomen: Soft, nontender, active bowel  sounds.  No palpable organomegaly.  No rebound or guarding.  No CVA tenderness.     Genitourinary: Exam deferred.     Musculoskeletal: Full range of motion. no pain, edema, or deformity. Pulses full and equal. No cyanosis or clubbing.      Neurological:  Cranial nerves II through XII are grossly intact, finger-nose touch is normal, normal sensation, no weakness, no focal findings identified.     Psychiatric: Appropriate mood and affect.      Results     Labs Reviewed   COMPREHENSIVE METABOLIC PANEL - Abnormal       Result Value    Glucose 84      Sodium 136      Potassium 5.4 (*)     Chloride 100      Bicarbonate 28      Anion Gap 13      Urea Nitrogen 8      Creatinine 0.93      eGFR 66      Calcium 9.5      Albumin 4.0      Alkaline Phosphatase 45      Total Protein 8.3 (*)     AST 44 (*)     Bilirubin, Total 0.7      ALT 21     CBC WITH AUTO DIFFERENTIAL - Abnormal    WBC 7.2      nRBC 0.0      RBC 4.70      Hemoglobin 12.5      Hematocrit 38.9      MCV 83      MCH 26.6      MCHC 32.1      RDW 13.7      Platelets 308      MPV 10.1      Neutrophils % 47.4      Immature Granulocytes %, Automated 0.1      Lymphocytes % 41.8      Monocytes % 7.8      Eosinophils % 1.9      Basophils % 1.0      Neutrophils Absolute 3.41      Immature Granulocytes Absolute, Automated 0.01      Lymphocytes Absolute 3.01 (*)     Monocytes Absolute 0.56      Eosinophils Absolute 0.14      Basophils Absolute 0.07     MAGNESIUM - Normal    Magnesium 1.60     SERIAL TROPONIN-INITIAL - Normal    Troponin I, High Sensitivity 3      Narrative:     Less than 99th percentile of normal range cutoff-  Female and children under 18 years old <14 ng/L; Male <21 ng/L: Negative  Repeat testing should be performed if clinically indicated.     Female and children under 18 years old 14-50 ng/L; Male 21-50 ng/L:  Consistent with possible cardiac damage and possible increased clinical   risk. Serial measurements may help to assess extent of myocardial  "damage.     >50 ng/L: Consistent with cardiac damage, increased clinical risk and  myocardial infarction. Serial measurements may help assess extent of   myocardial damage.      NOTE: Children less than 1 year old may have higher baseline troponin   levels and results should be interpreted in conjunction with the overall   clinical context.     NOTE: Troponin I testing is performed using a different   testing methodology at [facility] than at other   [facility]. Direct result comparisons should only   be made within the same method.   TROPONIN SERIES- (INITIAL, 1 HR)    Narrative:     The following orders were created for panel order Troponin I Series, High Sensitivity (0, 1 HR).  Procedure                               Abnormality         Status                     ---------                               -----------         ------                     Troponin I, High Sensiti...[[id number]]  Normal              Final result               Troponin, High Sensitivi...[[id number]]                                                   Please view results for these tests on the individual orders.   SERIAL TROPONIN, 1 HOUR     XR chest 1 view   Final Result   No active disease in the chest.             Signed by: Filiberto Pinedo 10/31/2023 3:00 PM   Dictation workstation:   [id number]            ED Course & Medical Decision Making     Medications   dilTIAZem (Cardizem) injection 20 mg (20 mg intravenous Given 10/31/23 1537)   sodium chloride 0.9 % bolus 1,000 mL (1,000 mL intravenous New Bag 10/31/23 1621)     Diagnoses as of 10/31/23 1734   Atrial fibrillation with RVR (CMS/HCC)     Heart Rate:  []   Temp:  [36.3 °C (97.3 °F)]   Resp:  [13-25]   BP: ()/(46-87)   Height:  [149.9 cm (4' 11.02\")]   Weight:  [91.9 kg (202 lb 9.6 oz)]   SpO2:  [95 %-98 %]      Elias Mayorga is a 73 y.o. female with past medical history of atrial fibrillation on Eliquis, hypertension, CVA, and hyperlipidemia who " presents with complaint of atrial fibrillation.  EKG did show atrial fibrillation with RVR with a rate of 110 bpm with no new acute ischemic changes noted.  Patient reportedly already spoke with her primary care provider Dr. Sutherland who wanted her admitted.  Patient otherwise well-appearing.  No active complaints.  Patient has known atrial fibrillation and is currently in RVR.  She is already anticoagulated on Eliquis.  I did give her an IV diltiazem bolus of 20 mg since she did have reduction of her heart rate into the 90s although occasionally dropping into the low 100s.  Chest x-ray was otherwise nonacute.  No significant electrolyte normality, renal dysfunction, or leukocytosis or anemia noted.  Troponin normal.  Patient informed of these findings and I did talk with Dr. Sutherland who accepted the patient for admission for observation and possible cardiology consultation.    Procedures   Procedures    Diagnosis     1. Atrial fibrillation with RVR (CMS/[facility])        Disposition     Admitted to hospitalist team, discussed differential and findings with patient as well as any family members at bedside.      ED Prescriptions    None            Dandre Elizabeth DO  10/31/23 1382

## 2023-11-01 ENCOUNTER — APPOINTMENT (OUTPATIENT)
Dept: CARDIOLOGY | Facility: HOSPITAL | Age: 73
End: 2023-11-01
Payer: MEDICARE

## 2023-11-01 LAB
ANION GAP SERPL CALC-SCNC: 12 MMOL/L (ref 10–20)
BUN SERPL-MCNC: 10 MG/DL (ref 6–23)
CALCIUM SERPL-MCNC: 8.9 MG/DL (ref 8.6–10.3)
CHLORIDE SERPL-SCNC: 105 MMOL/L (ref 98–107)
CO2 SERPL-SCNC: 25 MMOL/L (ref 21–32)
CREAT SERPL-MCNC: 0.81 MG/DL (ref 0.5–1.05)
EJECTION FRACTION APICAL 4 CHAMBER: 67.4
ERYTHROCYTE [DISTWIDTH] IN BLOOD BY AUTOMATED COUNT: 13.8 % (ref 11.5–14.5)
GFR SERPL CREATININE-BSD FRML MDRD: 78 ML/MIN/1.73M*2
GLUCOSE SERPL-MCNC: 85 MG/DL (ref 74–99)
HCT VFR BLD AUTO: 34.5 % (ref 36–46)
HGB BLD-MCNC: 11.1 G/DL (ref 12–16)
MAGNESIUM SERPL-MCNC: 1.5 MG/DL (ref 1.6–2.4)
MCH RBC QN AUTO: 26.9 PG (ref 26–34)
MCHC RBC AUTO-ENTMCNC: 32.2 G/DL (ref 32–36)
MCV RBC AUTO: 84 FL (ref 80–100)
NRBC BLD-RTO: 0 /100 WBCS (ref 0–0)
PLATELET # BLD AUTO: 262 X10*3/UL (ref 150–450)
PMV BLD AUTO: 10.1 FL (ref 7.5–11.5)
POTASSIUM SERPL-SCNC: 3.9 MMOL/L (ref 3.5–5.3)
RBC # BLD AUTO: 4.12 X10*6/UL (ref 4–5.2)
SODIUM SERPL-SCNC: 138 MMOL/L (ref 136–145)
WBC # BLD AUTO: 6.7 X10*3/UL (ref 4.4–11.3)

## 2023-11-01 PROCEDURE — 99233 SBSQ HOSP IP/OBS HIGH 50: CPT | Performed by: NURSE PRACTITIONER

## 2023-11-01 PROCEDURE — 36415 COLL VENOUS BLD VENIPUNCTURE: CPT | Performed by: PHYSICIAN ASSISTANT

## 2023-11-01 PROCEDURE — 96365 THER/PROPH/DIAG IV INF INIT: CPT

## 2023-11-01 PROCEDURE — 2500000001 HC RX 250 WO HCPCS SELF ADMINISTERED DRUGS (ALT 637 FOR MEDICARE OP): Performed by: INTERNAL MEDICINE

## 2023-11-01 PROCEDURE — G0378 HOSPITAL OBSERVATION PER HR: HCPCS

## 2023-11-01 PROCEDURE — 93306 TTE W/DOPPLER COMPLETE: CPT

## 2023-11-01 PROCEDURE — 99222 1ST HOSP IP/OBS MODERATE 55: CPT | Performed by: INTERNAL MEDICINE

## 2023-11-01 PROCEDURE — 83735 ASSAY OF MAGNESIUM: CPT | Performed by: PHYSICIAN ASSISTANT

## 2023-11-01 PROCEDURE — 85027 COMPLETE CBC AUTOMATED: CPT | Performed by: PHYSICIAN ASSISTANT

## 2023-11-01 PROCEDURE — 93306 TTE W/DOPPLER COMPLETE: CPT | Performed by: INTERNAL MEDICINE

## 2023-11-01 PROCEDURE — 2500000001 HC RX 250 WO HCPCS SELF ADMINISTERED DRUGS (ALT 637 FOR MEDICARE OP): Performed by: PHYSICIAN ASSISTANT

## 2023-11-01 PROCEDURE — 80048 BASIC METABOLIC PNL TOTAL CA: CPT | Performed by: PHYSICIAN ASSISTANT

## 2023-11-01 PROCEDURE — 2500000004 HC RX 250 GENERAL PHARMACY W/ HCPCS (ALT 636 FOR OP/ED): Performed by: INTERNAL MEDICINE

## 2023-11-01 PROCEDURE — 93272 ECG/REVIEW INTERPRET ONLY: CPT | Performed by: INTERNAL MEDICINE

## 2023-11-01 PROCEDURE — 2500000001 HC RX 250 WO HCPCS SELF ADMINISTERED DRUGS (ALT 637 FOR MEDICARE OP): Performed by: NURSE PRACTITIONER

## 2023-11-01 RX ORDER — MAGNESIUM SULFATE HEPTAHYDRATE 40 MG/ML
2 INJECTION, SOLUTION INTRAVENOUS ONCE
Status: COMPLETED | OUTPATIENT
Start: 2023-11-01 | End: 2023-11-01

## 2023-11-01 RX ORDER — DILTIAZEM HYDROCHLORIDE 180 MG/1
180 CAPSULE, COATED, EXTENDED RELEASE ORAL DAILY
Status: DISCONTINUED | OUTPATIENT
Start: 2023-11-01 | End: 2023-11-02 | Stop reason: HOSPADM

## 2023-11-01 RX ORDER — DILTIAZEM HYDROCHLORIDE 180 MG/1
180 CAPSULE, COATED, EXTENDED RELEASE ORAL DAILY
Status: DISCONTINUED | OUTPATIENT
Start: 2023-11-01 | End: 2023-11-01

## 2023-11-01 RX ORDER — DILTIAZEM HYDROCHLORIDE 120 MG/1
120 CAPSULE, COATED, EXTENDED RELEASE ORAL DAILY
Status: DISCONTINUED | OUTPATIENT
Start: 2023-11-01 | End: 2023-11-02 | Stop reason: HOSPADM

## 2023-11-01 RX ORDER — SODIUM CHLORIDE 9 MG/ML
100 INJECTION, SOLUTION INTRAVENOUS CONTINUOUS
Status: DISCONTINUED | OUTPATIENT
Start: 2023-11-01 | End: 2023-11-02

## 2023-11-01 RX ORDER — METOPROLOL TARTRATE 1 MG/ML
5 INJECTION, SOLUTION INTRAVENOUS EVERY 6 HOURS PRN
Status: DISCONTINUED | OUTPATIENT
Start: 2023-11-01 | End: 2023-11-02 | Stop reason: HOSPADM

## 2023-11-01 RX ORDER — DILTIAZEM HYDROCHLORIDE 180 MG/1
180 CAPSULE, EXTENDED RELEASE ORAL DAILY
Status: DISCONTINUED | OUTPATIENT
Start: 2023-11-01 | End: 2023-11-01 | Stop reason: CLARIF

## 2023-11-01 RX ORDER — METOPROLOL TARTRATE 25 MG/1
25 TABLET, FILM COATED ORAL 2 TIMES DAILY
Status: DISCONTINUED | OUTPATIENT
Start: 2023-11-01 | End: 2023-11-02 | Stop reason: HOSPADM

## 2023-11-01 RX ADMIN — SODIUM CHLORIDE 100 ML/HR: 9 INJECTION, SOLUTION INTRAVENOUS at 09:15

## 2023-11-01 RX ADMIN — Medication 3 MG: at 21:22

## 2023-11-01 RX ADMIN — PERFLUTREN 10 ML OF DILUTION: 6.52 INJECTION, SUSPENSION INTRAVENOUS at 16:41

## 2023-11-01 RX ADMIN — APIXABAN 5 MG: 5 TABLET, FILM COATED ORAL at 11:32

## 2023-11-01 RX ADMIN — DILTIAZEM HYDROCHLORIDE 180 MG: 180 CAPSULE, COATED, EXTENDED RELEASE ORAL at 09:34

## 2023-11-01 RX ADMIN — ATORVASTATIN CALCIUM 80 MG: 80 TABLET, FILM COATED ORAL at 01:17

## 2023-11-01 RX ADMIN — Medication 3 MG: at 01:17

## 2023-11-01 RX ADMIN — APIXABAN 5 MG: 5 TABLET, FILM COATED ORAL at 23:00

## 2023-11-01 RX ADMIN — ASPIRIN 81 MG: 81 TABLET, COATED ORAL at 09:15

## 2023-11-01 RX ADMIN — METOPROLOL TARTRATE 25 MG: 25 TABLET, FILM COATED ORAL at 17:57

## 2023-11-01 RX ADMIN — ATORVASTATIN CALCIUM 80 MG: 80 TABLET, FILM COATED ORAL at 21:22

## 2023-11-01 RX ADMIN — DILTIAZEM HYDROCHLORIDE 120 MG: 120 CAPSULE, EXTENDED RELEASE ORAL at 11:32

## 2023-11-01 RX ADMIN — MAGNESIUM SULFATE HEPTAHYDRATE 2 G: 40 INJECTION, SOLUTION INTRAVENOUS at 09:16

## 2023-11-01 RX ADMIN — APIXABAN 5 MG: 5 TABLET, FILM COATED ORAL at 01:17

## 2023-11-01 ASSESSMENT — PAIN - FUNCTIONAL ASSESSMENT
PAIN_FUNCTIONAL_ASSESSMENT: 0-10

## 2023-11-01 ASSESSMENT — PAIN SCALES - GENERAL
PAINLEVEL_OUTOF10: 0 - NO PAIN

## 2023-11-01 ASSESSMENT — COGNITIVE AND FUNCTIONAL STATUS - GENERAL
DAILY ACTIVITIY SCORE: 24
DAILY ACTIVITIY SCORE: 24
MOBILITY SCORE: 23
CLIMB 3 TO 5 STEPS WITH RAILING: A LITTLE

## 2023-11-01 ASSESSMENT — ACTIVITIES OF DAILY LIVING (ADL): LACK_OF_TRANSPORTATION: NO

## 2023-11-01 NOTE — NURSING NOTE
Pt arrived to OBS 21, VSS, pt welcomed and educated on room, floor procedures, bed, call light and pt's rights and responsibilities. No additional questions at this time.

## 2023-11-01 NOTE — CARE PLAN
Problem: Med Adherence  Goal: CONSISTENTLY TAKE MEDICATIONS AS PRESCRIBED  11/1/2023 1729 by Sandeep Kwok RN  Outcome: Progressing  11/1/2023 1725 by Sandeep Kwok RN  Outcome: Progressing     Problem: Pain  Goal: My pain/discomfort is manageable  11/1/2023 1729 by Sandeep Kwok RN  Outcome: Progressing  11/1/2023 1725 by Sandeep Kwok RN  Outcome: Progressing     Problem: Safety  Goal: Patient will be injury free during hospitalization  11/1/2023 1729 by Sandeep Kwok RN  Outcome: Progressing  11/1/2023 1725 by Sandeep Kwok RN  Outcome: Progressing  Goal: I will remain free of falls  11/1/2023 1729 by Sandeep Kwok RN  Outcome: Progressing  11/1/2023 1725 by Sandeep Kwok RN  Outcome: Progressing     Problem: Daily Care  Goal: Daily care needs are met  11/1/2023 1729 by Sandeep Kwok RN  Outcome: Progressing  11/1/2023 1725 by Sandeep Kwok RN  Outcome: Progressing   The patient's goals for the shift include to get some sleep    The clinical goals for the shift include nsr    Over the shift, the patient did not make progress toward the following goals. Barriers to progression include . Recommendations to address these barriers include .

## 2023-11-01 NOTE — NURSING NOTE
Notified MD about pt previously holding anticoagulant Eliquis for procedure.  PAC ordered the med and said to go ahead and give due to change in afib HR.  Pt during the time HR was controlled 90-low 100's.  Eliquis given. Pt educated and verbalized understanding.     Upon getting up to void, pt was noted to have increase HR in 130's. Pt states no chest pain. Once back to bed, pt would go back down to 90's and controlled.

## 2023-11-01 NOTE — CARE PLAN
Problem: Med Adherence  Goal: CONSISTENTLY TAKE MEDICATIONS AS PRESCRIBED  Outcome: Progressing   The patient's goals for the shift include to get some sleep    The clinical goals for the shift include to be admitted    Over the shift, the patient did make progress toward the following goals. GOAL MET

## 2023-11-01 NOTE — PROGRESS NOTES
"Subjective Data:  Denies any symptom despite afib rvr 120s    Overnight Events:    N/A     Objective Data:  Last Recorded Vitals:    LABS:  CMP:  Results from last 7 days   Lab Units 11/01/23  0356 10/31/23  1520   SODIUM mmol/L 138 136   POTASSIUM mmol/L 3.9 5.4*   CHLORIDE mmol/L 105 100   CO2 mmol/L 25 28   ANION GAP mmol/L 12 13   BUN mg/dL 10 8   CREATININE mg/dL 0.81 0.93   EGFR mL/min/1.73m*2 78 66   MAGNESIUM mg/dL 1.50* 1.60   ALBUMIN g/dL  --  4.0   ALT U/L  --  21   AST U/L  --  44*   BILIRUBIN TOTAL mg/dL  --  0.7     CBC:  Results from last 7 days   Lab Units 11/01/23  0401 10/31/23  1520   WBC AUTO x10*3/uL 6.7 7.2   HEMOGLOBIN g/dL 11.1* 12.5   HEMATOCRIT % 34.5* 38.9   PLATELETS AUTO x10*3/uL 262 308   MCV fL 84 83     COAG:     ABO: No results found for: \"ABO\"  HEME/ENDO:     CARDIAC:   Results from last 7 days   Lab Units 10/31/23  1808 10/31/23  1520   TROPHS ng/L 3 3          Last I/O:  I/O last 3 completed shifts:  In: 770 (8.4 mL/kg) [P.O.:720; I.V.:50 (0.5 mL/kg)]  Out: - (0 mL/kg)   Weight: 91.6 kg           Inpatient Medications:  Scheduled medications   Medication Dose Route Frequency    apixaban  5 mg oral q12h    aspirin  81 mg oral Daily    atorvastatin  80 mg oral Nightly    dilTIAZem CD  120 mg oral Daily    dilTIAZem CD  180 mg oral Daily    melatonin  3 mg oral Nightly     PRN medications   Medication    acetaminophen     Continuous Medications   Medication Dose Last Rate    sodium chloride 0.9%  100 mL/hr 100 mL/hr (11/01/23 1133)       Physical Exam:  Physical Exam  Vitals reviewed.   Constitutional:       Appearance: Normal appearance.   Eyes:      Pupils: Pupils are equal, round, and reactive to light.   Cardiovascular:      Rate and Rhythm: Irregularly irregular rate and regular rhythm.      Pulses: Normal pulses.      Heart sounds: Normal heart sounds.   Pulmonary:      Effort: Pulmonary effort is normal.      Breath sounds: Normal breath sounds.   Abdominal:      General: " Abdomen is flat. Bowel sounds are normal.      Palpations: Abdomen is soft.   Musculoskeletal:         General: Normal range of motion.   Skin:     General: Skin is warm and dry.      Capillary Refill: Capillary refill takes less than 2 seconds.   Neurological:      General: No focal deficit present.      Mental Status: He is alert.   Psychiatric:         Mood and Affect: Mood normal.        Assessment/Plan     Patient with a past medical history of paroxysmal atrial fibrillation ,hypertension, dyslipidemia , CVA & osteoarthritis who presented to the colonoscopy suite after prepping for colonoscopy and was noted to be in atrial fibrillation RVR.    I reviewed tele afib rvr 115-120. No other events.  I reviewed ECG. Afib rvr 120 bpm, NS ST T abnormalities.  I reviewed vital signs, labs, prior cardiology and PCP notes.    1.Afib rvr with known prior hx of p afib, on Dilt 300 mg daily chronically at least since 2020. Still in RVR. This was likely triggered by dehydration as well as electrolyte abnormalities  however still persistent , asymptomatic so ? This has been going on more chronically. Adding Metoprolol 25 mg BID, IV Metoprolol 5 mg PRN for breakthrough, Checking echo and TSH if no recent check.    2. DLD cont statin    3. Hx of CVA  On both ASA and Eliquis   DC ASA since already on Eliquis, no indication for both     4. HTN On Dilt. Continue, monitoring     5. Hypomagnesemia Replaced.     6. DVT prophylaxis On Eliquis    7. Dispo Home in am if rates better and Echo without acute findings.     Code Status:  Full Code    I spent 35 minutes in the professional and overall care of this patient.        Siena Birmingham, APRN-CNP

## 2023-11-01 NOTE — PROGRESS NOTES
11/01/23 1326   Discharge Planning   Living Arrangements Spouse/significant other   Support Systems Spouse/significant other;Family members;Friends/neighbors   Assistance Needed No home going needs identified   Type of Residence Private residence   Number of Stairs Within Residence 13   Home or Post Acute Services None   Patient expects to be discharged to: home   Does the patient need discharge transport arranged? Yes   RoundTrip coordination needed? Yes   Housing Stability   In the last 12 months, was there a time when you did not have a steady place to sleep or slept in a shelter (including now)? N   Transportation Needs   In the past 12 months, has lack of transportation kept you from medical appointments or from getting medications? no   In the past 12 months, has lack of transportation kept you from meetings, work, or from getting things needed for daily living? No     Met with patient and  at bedside to  discuss her preferences for care upon discharge. Discussed how patient manages health at home. Lives with her   in a house .  Independent in all ADLs.  No home O2, dialysis, or assistive devices.  Patient is typically very independent and drives.  No additional resources or needs identified. Reviewed today's plan of care.  Patient verbalized understanding as evidenced by teach back method. Patient plans to return home at discharge & follow up with her PCP.   will provide transportation home upon discharge.  Anticipate discharge tomorrow pending ECHO and cardiology recommendations.  MATT Yeboah RN T[facility]

## 2023-11-01 NOTE — H&P
Elias Mayorga is a 73 y.o. female   Atrial Fibrillation  Past medical history includes atrial fibrillation.      Patient with a past medical history of atrial fibrillation hypertension dyslipidemia osteoarthritis who presented to the colonoscopy suite after prepping for colonoscopy and was noted to be in atrial fibrillation RVR  Patient denies any chest pain shortness of breath nausea vomiting she did take her Cardizem yesterday but was holding the Eliquis for the colonoscopy          Past Medical History  Past Medical History:   Diagnosis Date    Arthritis     Essential (primary) hypertension 11/11/2022    Hypertension    Hyperkalemia 12/01/2013    Hyperkalemia    Hyperlipidemia     Other conditions influencing health status 12/01/2013    Osteoarthritis Of Multiple Sites    Other persistent atrial fibrillation (CMS/HCC) 01/04/2019    Persistent atrial fibrillation    Stroke (CMS/HCC)     5 years ago       Surgical History  Past Surgical History:   Procedure Laterality Date    CT ANGIO CORONARY ART WITH HEARTFLOW IF SCORE >30%  9/24/2020    CT HEART CORONARY ANGIOGRAM 9/24/2020 [facility]    MR HEAD ANGIO WO IV CONTRAST  12/28/2018    MR HEAD ANGIO WO IV CONTRAST 12/28/2018 [facility] CLINICAL LEGAC[facility]    MR NECK ANGIO WO IV CONTRAST  12/28/2018    MR NECK ANGIO WO IV CONTRAST 12/28/2018 [facility] CLINICAL [facility]GAC[facility]    TUBAL LIGATION  07/18/2014    Tubal Ligation        Social History  She reports that she has never smoked. She has never used smokeless tobacco. She reports that she does not drink alcohol and does not use drugs.    Family History  Family History   Problem Relation Name Age of Onset    Hypertension Mother      Hypertension Father      Cancer Father      Other (gastric cancer) Father          Allergies  Penicillins    Review of Systems     Constitutional: not feeling poorly, no fever, no recent weight gain and no recent weight loss.   Eyes: no blurred vision and no diplopia.   ENT: no hearing loss, no  tinnitus, no earache, no sore throat, no hoarseness and no swollen glands in the neck.   Cardiovascular: no chest pain, no tightness or heavy pressure, no shortness of breath, and no lower extremity edema.   Respiratory: no cough, wheezing or shortness of breath at rest or exertion  Gastrointestinal: no change in bowel habits, no diarrhea, no constipation, no bloody stools, no nausea, no vomiting, no abdominal pain, no signs and symptoms of ulcer disease, no william colored stools and no intolerance to fatty foods.   Genitourinary: no urinary frequency, no dysuria, no hematuria, no burning sensation during urination, urinary stream is not smaller and urinary stream does not start and stop.   Musculoskeletal: no arthralgias, no joint stiffness, no muscle weakness, no back pain and no difficulty walking.   Skin: no rashes, no change in skin color and pigmentation, no skin lesions and no skin lumps.   Neurological: no headaches, no dizziness, no seizures, no tingling, no numbness, no signs and symptoms of stroke and no limb weakness.   Psychiatric: no confusion, no memory lapses or loss, no depression and no sleep disturbances.   Endocrine: no goiter, no thyroid disorder, no diabetes mellitus, no excessive thirst, no dry skin, no cold intolerance, no heat intolerance and no increased urinary frequency.   Hematologic/Lymphatic: is not slow to heal, does not bleed easily, does not bruise easily, no thrombophlebitis, no anemia and no history of blood transfusion.   All other systems have been reviewed and are negative for complaint.     Vitals:    11/01/23 0816   BP: 111/70   Pulse: (!) 121   Resp: 18   Temp: 36.7 °C (98.1 °F)   SpO2: 98%        Scheduled medications  apixaban, 5 mg, oral, q12h  aspirin, 81 mg, oral, Daily  atorvastatin, 80 mg, oral, Nightly  dilTIAZem CD, 180 mg, oral, Daily  dilTIAZem XR, 120 mg, oral, Daily  magnesium sulfate, 2 g, intravenous, Once  melatonin, 3 mg, oral, Nightly      Continuous  medications  sodium chloride 0.9%, 100 mL/hr, Last Rate: 100 mL/hr (11/01/23 0915)      PRN medications  PRN medications: acetaminophen    Results from last 7 days   Lab Units 11/01/23  0401 10/31/23  1520   WBC AUTO x10*3/uL 6.7 7.2   HEMOGLOBIN g/dL 11.1* 12.5   HEMATOCRIT % 34.5* 38.9   PLATELETS AUTO x10*3/uL 262 308     Results from last 7 days   Lab Units 11/01/23  0356 10/31/23  1520   SODIUM mmol/L 138 136   POTASSIUM mmol/L 3.9 5.4*   CHLORIDE mmol/L 105 100   CO2 mmol/L 25 28   BUN mg/dL 10 8   CREATININE mg/dL 0.81 0.93   CALCIUM mg/dL 8.9 9.5   PROTEIN TOTAL g/dL  --  8.3*   BILIRUBIN TOTAL mg/dL  --  0.7   ALK PHOS U/L  --  45   ALT U/L  --  21   AST U/L  --  44*   GLUCOSE mg/dL 85 84     Results from last 7 days   Lab Units 10/31/23  1808 10/31/23  1520   TROPHS ng/L 3 3        XR chest 1 view   Final Result   No active disease in the chest.             Signed by: Filiberto Pinedo 10/31/2023 3:00 PM   Dictation workstation:   [id number]      Transthoracic Echo (TTE) Complete    (Results Pending)       Physical Exam     Constitutional   General appearance: Alert and in no acute distress.   Eyes   Inspection of eyes: Sclera and conjunctiva were normal.    Pupil exam: Pupils were equal in size. Extraocular movements were intact.   Pulmonary   Respiratory assessment: No respiratory distress, normal respiratory rhythm and effort.    Auscultation of Lungs: Clear bilateral breath sounds.   Cardiovascular   Auscultation of heart: Irregularly irregular no murmurs and no pericardial rub.    Exam for edema: No peripheral edema.   Abdomen   Abdominal Exam: No bruits, normal bowel sounds, soft, non-tender, no abdominal mass palpated.    Liver and Spleen exam: No hepato-splenomegaly.   Musculoskeletal   Examination of gait: Normal.    Inspection of digits and nails: No clubbing or cyanosis of the fingernails.    Inspection/palpation of joints, bones and muscles: No joint swelling. Normal movement of all extremities.    Skin   Skin inspection: Normal skin color and pigmentation, normal skin turgor and no visible rash.   Neurologic   Cranial nerves: Nerves 2-12 were intact, no focal neuro defects.   Psychiatric   Orientation: Oriented to person, place, and time.    Mood and affect: Normal.       Assessment/Plan      #Atrial fibrillation with RVR  Brought on by dehydration and hypomagnesemia secondary to diarrhea by the GI prep  Replace magnesium  IV fluids  Echocardiogram  Resume Cardizem  Resume Eliquis    #Hypertension  Holding spironolactone    #Dyslipidemia  Continue statins

## 2023-11-01 NOTE — CARE PLAN
Problem: Med Adherence  Goal: CONSISTENTLY TAKE MEDICATIONS AS PRESCRIBED  Outcome: Progressing     Problem: Pain  Goal: My pain/discomfort is manageable  Outcome: Progressing     Problem: Safety  Goal: Patient will be injury free during hospitalization  Outcome: Progressing  Goal: I will remain free of falls  Outcome: Progressing     Problem: Daily Care  Goal: Daily care needs are met  Outcome: Progressing     Problem: Psychosocial Needs  Goal: Demonstrates ability to cope with hospitalization/illness  Outcome: Progressing  Goal: Collaborate with me, my family, and caregiver to identify my specific goals  Outcome: Progressing   The patient's goals for the shift include to get some sleep    The clinical goals for the shift include to be admitted    Over the shift, the patient did not make progress toward the following goals. Barriers to progression include . Recommendations to address these barriers include .

## 2023-11-02 VITALS
OXYGEN SATURATION: 94 % | SYSTOLIC BLOOD PRESSURE: 124 MMHG | HEART RATE: 72 BPM | TEMPERATURE: 98.8 F | DIASTOLIC BLOOD PRESSURE: 78 MMHG | WEIGHT: 202 LBS | RESPIRATION RATE: 18 BRPM | BODY MASS INDEX: 40.72 KG/M2 | HEIGHT: 59 IN

## 2023-11-02 LAB
ANION GAP SERPL CALC-SCNC: 11 MMOL/L (ref 10–20)
BUN SERPL-MCNC: 10 MG/DL (ref 6–23)
CALCIUM SERPL-MCNC: 8.6 MG/DL (ref 8.6–10.3)
CHLORIDE SERPL-SCNC: 105 MMOL/L (ref 98–107)
CO2 SERPL-SCNC: 25 MMOL/L (ref 21–32)
CREAT SERPL-MCNC: 0.77 MG/DL (ref 0.5–1.05)
ERYTHROCYTE [DISTWIDTH] IN BLOOD BY AUTOMATED COUNT: 13.8 % (ref 11.5–14.5)
GFR SERPL CREATININE-BSD FRML MDRD: 83 ML/MIN/1.73M*2
GLUCOSE SERPL-MCNC: 89 MG/DL (ref 74–99)
HCT VFR BLD AUTO: 33.7 % (ref 36–46)
HGB BLD-MCNC: 10.6 G/DL (ref 12–16)
MAGNESIUM SERPL-MCNC: 1.7 MG/DL (ref 1.6–2.4)
MCH RBC QN AUTO: 26.7 PG (ref 26–34)
MCHC RBC AUTO-ENTMCNC: 31.5 G/DL (ref 32–36)
MCV RBC AUTO: 85 FL (ref 80–100)
NRBC BLD-RTO: 0 /100 WBCS (ref 0–0)
PLATELET # BLD AUTO: 238 X10*3/UL (ref 150–450)
POTASSIUM SERPL-SCNC: 3.7 MMOL/L (ref 3.5–5.3)
RBC # BLD AUTO: 3.97 X10*6/UL (ref 4–5.2)
SODIUM SERPL-SCNC: 137 MMOL/L (ref 136–145)
WBC # BLD AUTO: 5.5 X10*3/UL (ref 4.4–11.3)

## 2023-11-02 PROCEDURE — 2500000001 HC RX 250 WO HCPCS SELF ADMINISTERED DRUGS (ALT 637 FOR MEDICARE OP): Performed by: NURSE PRACTITIONER

## 2023-11-02 PROCEDURE — 85027 COMPLETE CBC AUTOMATED: CPT | Performed by: NURSE PRACTITIONER

## 2023-11-02 PROCEDURE — 99239 HOSP IP/OBS DSCHRG MGMT >30: CPT | Performed by: INTERNAL MEDICINE

## 2023-11-02 PROCEDURE — 36415 COLL VENOUS BLD VENIPUNCTURE: CPT | Performed by: NURSE PRACTITIONER

## 2023-11-02 PROCEDURE — 83735 ASSAY OF MAGNESIUM: CPT | Performed by: NURSE PRACTITIONER

## 2023-11-02 PROCEDURE — G0378 HOSPITAL OBSERVATION PER HR: HCPCS

## 2023-11-02 PROCEDURE — 2500000001 HC RX 250 WO HCPCS SELF ADMINISTERED DRUGS (ALT 637 FOR MEDICARE OP): Performed by: PHYSICIAN ASSISTANT

## 2023-11-02 PROCEDURE — 80048 BASIC METABOLIC PNL TOTAL CA: CPT | Performed by: NURSE PRACTITIONER

## 2023-11-02 RX ORDER — MAGNESIUM SULFATE HEPTAHYDRATE 40 MG/ML
2 INJECTION, SOLUTION INTRAVENOUS ONCE
Status: DISCONTINUED | OUTPATIENT
Start: 2023-11-02 | End: 2023-11-02 | Stop reason: HOSPADM

## 2023-11-02 RX ORDER — SPIRONOLACTONE 25 MG/1
12.5 TABLET ORAL DAILY
Qty: 15 TABLET | Refills: 1 | Status: SHIPPED | OUTPATIENT
Start: 2023-11-02 | End: 2024-01-04 | Stop reason: SDUPTHER

## 2023-11-02 RX ADMIN — METOPROLOL TARTRATE 25 MG: 25 TABLET, FILM COATED ORAL at 08:55

## 2023-11-02 RX ADMIN — ASPIRIN 81 MG: 81 TABLET, COATED ORAL at 08:56

## 2023-11-02 ASSESSMENT — COGNITIVE AND FUNCTIONAL STATUS - GENERAL
MOBILITY SCORE: 23
EATING MEALS: A LITTLE
DAILY ACTIVITIY SCORE: 23
CLIMB 3 TO 5 STEPS WITH RAILING: A LITTLE

## 2023-11-02 ASSESSMENT — PAIN SCALES - GENERAL: PAINLEVEL_OUTOF10: 0 - NO PAIN

## 2023-11-02 NOTE — DISCHARGE SUMMARY
Discharge Diagnosis  Atrial fibrillation with rapid ventricular response (CMS/[facility])    Issues Requiring Follow-Up  Outpatient cardiology establishing    Test Results Pending At Discharge  Pending Labs       No current pending labs.            Hospital Course  Was admitted from the colonoscopy suite with A-fib RVR  Likely brought on by hypomagnesemia hypotension brought on by dehydration from colonoscopy prep  Replaced magnesium and hydrated with fluids  Resume patient's Cardizem  Eventually the rates got controlled and blood pressure has improved  Denies any chest pain shortness of breath nausea vomiting  Ambulating without difficulty  We will discharge the patient home on regular medications    Discharge diagnosis  Atrial fibrillation RVR atrial fibrillation with RVR  Hypotension  Hypomagnesemia    Will need to reconsult and redo the colonoscopy    Pertinent Physical Exam At Time of Discharge  Physical Exam    Constitutional   General appearance: Alert and in no acute distress.     Pulmonary   Respiratory assessment: No respiratory distress, normal respiratory rhythm and effort.    Auscultation of Lungs: Clear bilateral breath sounds.   Cardiovascular   Auscultation of heart: Irregularly irregular normal S1 and S2, no murmurs and no pericardial rub.    Exam for edema: No peripheral edema.   Abdomen   Abdominal Exam: No bruits, normal bowel sounds, soft, non-tender, no abdominal mass palpated.    Liver and Spleen exam: No hepato-splenomegaly.   Musculoskeletal   Examination of gait: Normal.    Inspection of digits and nails: No clubbing or cyanosis of the fingernails.    Inspection/palpation of joints, bones and muscles: No joint swelling. Normal movement of all extremities.   Skin   Skin inspection: Normal skin color and pigmentation, normal skin turgor and no visible rash.   Neurologic   Cranial nerves: Nerves 2-12 were intact, no focal neuro defects.       Home Medications     Medication List      CHANGE how you  take these medications     spironolactone 25 mg tablet; Commonly known as: Aldactone; Take 0.5   tablets (12.5 mg) by mouth once daily.; What changed: how much to take     CONTINUE taking these medications     apixaban 5 mg tablet; Commonly known as: Eliquis; Take 1 tablet (5 mg)   by mouth every 12 hours.   atorvastatin 80 mg tablet; Commonly known as: Lipitor; Take 1 tablet (80   mg) by mouth once daily at bedtime.   CALCIUM CARBONATE-VITAMIN D3 ORAL   Centrum Silver; Generic drug: multivitamin with minerals iron-free   cholecalciferol 25 MCG (1000 UT) tablet; Commonly known as: Vitamin D-3   * dilTIAZem  mg 24 hr capsule; Commonly known as: DILT-XR; Take 1   capsule (120 mg) by mouth once daily.   * dilTIAZem  mg 24 hr capsule; Commonly known as: Dilacor XR; Take   1 capsule (180 mg) by mouth once daily.  * This list has 2 medication(s) that are the same as other medications   prescribed for you. Read the directions carefully, and ask your doctor or   other care provider to review them with you.       Outpatient Follow-Up  Future Appointments   Date Time Provider Department Center   12/4/2023 10:30 AM Ana Rosa Sutherland MD [id number] [facility]     Patient seen at bedside. Events from the last visit reviewed. Discussed with staff. Results of tests and investigations from last visit reviewed and discussed with patient/Family. Electronic chart on [facility] reviewed. Input / Recommendations  from consultants  appreciated and reviewed and agreed with.     discharge summary and profile completed. medications reviewed and discussed with patient and family.  scripts completed and signed.     total discharge time in excess of 30 minutes.    Ana Rosa Sutherland MD

## 2023-11-02 NOTE — CARE PLAN
The patient's goals for the shift include to get some sleep    The clinical goals for the shift include pt will be free from injury this shift

## 2023-11-02 NOTE — CARE PLAN
The patient's goals for the shift include going home    The clinical goals for the shift include pt will be free from injury this shift    Problem: Med Adherence  Goal: CONSISTENTLY TAKE MEDICATIONS AS PRESCRIBED  Outcome: Adequate for Discharge     Problem: Pain  Goal: My pain/discomfort is manageable  Outcome: Adequate for Discharge     Problem: Safety  Goal: Patient will be injury free during hospitalization  Outcome: Adequate for Discharge  Goal: I will remain free of falls  Outcome: Adequate for Discharge     Problem: Daily Care  Goal: Daily care needs are met  Outcome: Adequate for Discharge     Problem: Psychosocial Needs  Goal: Demonstrates ability to cope with hospitalization/illness  Outcome: Adequate for Discharge  Goal: Collaborate with me, my family, and caregiver to identify my specific goals  Outcome: Adequate for Discharge     Problem: Discharge Barriers  Goal: My discharge needs are met  Outcome: Adequate for Discharge     Problem: Skin  Goal: Decreased wound size/increased tissue granulation at next dressing change  Outcome: Adequate for Discharge  Goal: Participates in plan/prevention/treatment measures  Outcome: Adequate for Discharge  Goal: Prevent/manage excess moisture  Outcome: Adequate for Discharge  Goal: Prevent/minimize sheer/friction injuries  Outcome: Adequate for Discharge  Goal: Promote/optimize nutrition  Outcome: Adequate for Discharge  Goal: Promote skin healing  Outcome: Adequate for Discharge

## 2023-11-03 ENCOUNTER — PATIENT OUTREACH (OUTPATIENT)
Dept: CARE COORDINATION | Facility: CLINIC | Age: 73
End: 2023-11-03
Payer: MEDICARE

## 2023-11-03 NOTE — PROGRESS NOTES
Discharge Facility:[facility]  Discharge Diagnosis:AFIB with rapid response  Admission Date:10/31/23  Discharge Date: 11/02/23    PCP Appointment Date:  Specialist Appointment Date:   Hospital Encounter and Summary: Linked   See discharge assessment below for further details'  Engagement  Call Start Time: 0837 (11/3/2023  8:37 AM)    Medications  Medications reviewed with patient/caregiver?: Yes (no new meds) (11/3/2023  8:37 AM)  Is the patient having any side effects they believe may be caused by any medication additions or changes?: No (11/3/2023  8:37 AM)  Is the patient taking all medications as directed (includes completed medication regime)?: Yes (11/3/2023  8:37 AM)    Appointments  Does the patient have a primary care provider?: Yes (11/3/2023  8:37 AM)  Care Management Interventions: Advised patient to make appointment (11/3/2023  8:37 AM)    Self Management  Has home health visited the patient within 72 hours of discharge?: Not applicable (11/3/2023  8:37 AM)  Has all Durable Medical Equipment (DME) been delivered?: No (11/3/2023  8:37 AM)    Patient Teaching  Does the patient have access to their discharge instructions?: Yes (11/3/2023  8:37 AM)  Care Management Interventions: Reviewed instructions with patient (11/3/2023  8:37 AM)  What is the patient's perception of their health status since discharge?: Improving (11/3/2023  8:37 AM)    Wrap Up  Call End Time: 0840 (11/3/2023  8:37 AM)

## 2023-11-05 ENCOUNTER — HOSPITAL ENCOUNTER (OUTPATIENT)
Dept: CARDIOLOGY | Facility: HOSPITAL | Age: 73
Discharge: HOME | End: 2023-11-05
Payer: MEDICARE

## 2023-11-05 LAB
Q ONSET: 225 MS
QRS COUNT: 20 BEATS
QRS DURATION: 72 MS
QT INTERVAL: 284 MS
QTC CALCULATION(BAZETT): 398 MS
QTC FREDERICIA: 355 MS
R AXIS: 40 DEGREES
T AXIS: -22 DEGREES
T OFFSET: 367 MS
VENTRICULAR RATE: 118 BPM

## 2023-11-05 PROCEDURE — 93005 ELECTROCARDIOGRAM TRACING: CPT

## 2023-11-15 ENCOUNTER — PATIENT OUTREACH (OUTPATIENT)
Dept: CARE COORDINATION | Facility: CLINIC | Age: 73
End: 2023-11-15
Payer: MEDICARE

## 2023-11-15 NOTE — PROGRESS NOTES
Unable to reach patient for call back after patient's follow up appointment with PCP.   DEYSIM with call back number for patient to call if needed   If no voicemail available call attempts x 2 were made to contact the patient to assist with any questions or concerns patient may have.

## 2023-11-20 DIAGNOSIS — I63.9 CEREBROVASCULAR ACCIDENT (CVA), UNSPECIFIED MECHANISM (MULTI): Primary | ICD-10-CM

## 2023-11-27 NOTE — PROGRESS NOTES
Pharmacy Post-Discharge Visit Initial  Elias Mayorga is a 73 y.o. female was referred to Clinical Pharmacy Team to complete a post-discharge medication optimization and monitoring visit.  The patient was referred for their Cerebrovascular Accident and Atrial Fibrillation.    Admission Date: 10.31.23  Discharge Date: 23 for Afib with RVR s/p colonoscopy    Referring Provider: Ana Rosa Sutherland MD      Does patient see endocrine? N/A  Does patient see cardiology? Yes, will be seeing Dr. JOE Montano on 23  Who are the patient's medications managed by? PCP      Subjective   Allergies   Allergen Reactions    Penicillins Hives       [id number] - [place], [place] - [address] AT [facility] [address] [facility]  [address] [facility] [address]  Phone: [phone] Fax: [phone]      Social History     Social History Narrative    Not on file        Notable Medication changes following discharge:  Change: spironolactone decreased to 12.5mg daily - patient reported that she has always been on this dose      HPI  CVA ASSESSMENT - happened in 2018  Regimen:  -Stroke Meds:   -Anticoagulant: Yes, describe: Eliquis 5mg BID   -Antiplatelet: No  -Duration of therapy: > 5 years  -The ASCVD Risk score (Camacho DK, et al., 2019) failed to calculate for the following reasons:    The patient has a prior MI or stroke diagnosis     HTN Assessment  -HTN diagnosis: yes  -There were no vitals filed for this visit.   -Current Regimen   -diltiazem XR 300mg daily in the morning  -BP Cuff at home? no  -HTN at goal? yes    HLD Assessment - 23  -Current LDL: 0  -Current T  -Current Regimen   -atorvastatin 80mg QHS  -HLD at goal? yes    Diabetes Mellitus Assessment:  -Diagnosis? no        Review of Systems    Objective     There were no vitals taken for this visit.     LAB  Lab Results   Component Value Date    BILITOT 0.7 10/31/2023    CALCIUM 8.6 2023    CO2 25 2023    CL  105 11/02/2023    CREATININE 0.77 11/02/2023    GLUCOSE 89 11/02/2023    ALKPHOS 45 10/31/2023    K 3.7 11/02/2023    PROT 8.3 (H) 10/31/2023     11/02/2023    AST 44 (H) 10/31/2023    ALT 21 10/31/2023    BUN 10 11/02/2023    ANIONGAP 11 11/02/2023    MG 1.70 11/02/2023    ALBUMIN 4.0 10/31/2023    GFRF 57 (A) 03/24/2023     Lab Results   Component Value Date    TRIG 45 08/23/2023    CHOL 135 08/23/2023    LDLCALC 0 08/23/2023    HDL 36 08/23/2023     Lab Results   Component Value Date    HGBA1C 5.6 12/28/2018         Current Outpatient Medications on File Prior to Visit   Medication Sig Dispense Refill    apixaban (Eliquis) 5 mg tablet Take 1 tablet (5 mg) by mouth every 12 hours. 120 tablet 3    atorvastatin (Lipitor) 80 mg tablet Take 1 tablet (80 mg) by mouth once daily at bedtime. 90 tablet 0    CALCIUM CARBONATE-VITAMIN D3 ORAL TAKE AS DIRECTED.      cholecalciferol (Vitamin D-3) 25 MCG (1000 UT) tablet Take 1 tablet (25 mcg) by mouth once daily.      dilTIAZem XR (Dilacor XR) 180 mg 24 hr capsule Take 1 capsule (180 mg) by mouth once daily. 90 capsule 0    dilTIAZem XR (DILT-XR) 120 mg 24 hr capsule Take 1 capsule (120 mg) by mouth once daily. 60 capsule 0    multivit-iron-minerals-folic acid (Centrum Silver) 0.4 mg-300 mcg- 250 mcg tab Take 1 tablet by mouth once daily.      spironolactone (Aldactone) 25 mg tablet Take 0.5 tablets (12.5 mg) by mouth once daily. 15 tablet 1     No current facility-administered medications on file prior to visit.        HISTORICAL PHARMACOTHERAPY  none    DRUG INTERACTIONS   none    Assessment/Plan   Problem List Items Addressed This Visit       CVA (cerebrovascular accident) (CMS/HCC)     Incident occurred in 2018 and patient has been stable. Balbir on Eliquis 5mg BID (for afib). Will be seeing cardiologist at end of December.     Recent admission was d/t Afib after colonoscopy prep.     No changes required at this time.    Continue:  Eliquis 5mg BID  Atorvastatin  80mg at bedtime          Patient did not express any cost issues with medications at this time. We discussed filling with  and  patient assistance program. Patient will reach out should issues or any questions arise in the future.     Labs ordered: none    Follow-up: as needed     Time spent with pt: Total length of time 13 (minutes) of the encounter and more than 50% was spent counseling the patient.    Josselin Lunsford PharmD, MINDY  Clinical Pharmacist  Pharmacy Services  [phone]    Continue all meds under the continuation of care with the referring provider and clinical pharmacy team.    Verbal consent to manage patient's drug therapy was obtained from the patient. They were informed they may decline to participate or withdraw from participation in pharmacy services at any time.

## 2023-11-28 ENCOUNTER — TELEMEDICINE (OUTPATIENT)
Dept: PHARMACY | Facility: HOSPITAL | Age: 73
End: 2023-11-28
Payer: MEDICARE

## 2023-11-28 DIAGNOSIS — I63.9 CEREBROVASCULAR ACCIDENT (CVA), UNSPECIFIED MECHANISM (MULTI): ICD-10-CM

## 2023-11-28 RX ORDER — AA/PROT/LYSINE/METHIO/VIT C/B6 50-12.5 MG
10 TABLET ORAL DAILY
COMMUNITY

## 2023-11-28 RX ORDER — ACETAMINOPHEN 325 MG/1
650 TABLET ORAL EVERY 6 HOURS PRN
COMMUNITY
Start: 2023-08-27

## 2023-11-28 NOTE — ASSESSMENT & PLAN NOTE
Incident occurred in 2018 and patient has been stable. Balbir on Eliquis 5mg BID (for afib). Will be seeing cardiologist at end of December.     Recent admission was d/t Afib after colonoscopy prep.     No changes required at this time.    Continue:  Eliquis 5mg BID  Atorvastatin 80mg at bedtime

## 2023-12-01 ENCOUNTER — PATIENT OUTREACH (OUTPATIENT)
Dept: CARE COORDINATION | Facility: CLINIC | Age: 73
End: 2023-12-01
Payer: MEDICARE

## 2023-12-01 NOTE — PROGRESS NOTES
Unable to reach patient for one month post discharge follow up call.   M with call back number for patient to call if needed   If no voicemail available call attempts x 2 were made to contact the patient to assist with any questions or concerns patient may have.  Patient is is SNF  at this time

## 2023-12-07 DIAGNOSIS — I48.19 PERSISTENT ATRIAL FIBRILLATION (MULTI): ICD-10-CM

## 2023-12-07 RX ORDER — DILTIAZEM HYDROCHLORIDE 120 MG/1
120 CAPSULE, EXTENDED RELEASE ORAL DAILY
Qty: 90 CAPSULE | Refills: 0 | Status: SHIPPED | OUTPATIENT
Start: 2023-12-07 | End: 2024-01-04 | Stop reason: SDUPTHER

## 2023-12-27 ENCOUNTER — OFFICE VISIT (OUTPATIENT)
Dept: CARDIOLOGY | Facility: HOSPITAL | Age: 73
End: 2023-12-27
Payer: MEDICARE

## 2023-12-27 VITALS
SYSTOLIC BLOOD PRESSURE: 132 MMHG | WEIGHT: 202 LBS | DIASTOLIC BLOOD PRESSURE: 90 MMHG | OXYGEN SATURATION: 98 % | HEIGHT: 59 IN | BODY MASS INDEX: 40.72 KG/M2 | HEART RATE: 105 BPM

## 2023-12-27 DIAGNOSIS — I48.19 PERSISTENT ATRIAL FIBRILLATION (MULTI): Primary | ICD-10-CM

## 2023-12-27 PROCEDURE — 99214 OFFICE O/P EST MOD 30 MIN: CPT | Mod: 25 | Performed by: INTERNAL MEDICINE

## 2023-12-27 PROCEDURE — 1160F RVW MEDS BY RX/DR IN RCRD: CPT | Performed by: INTERNAL MEDICINE

## 2023-12-27 PROCEDURE — 1159F MED LIST DOCD IN RCRD: CPT | Performed by: INTERNAL MEDICINE

## 2023-12-27 PROCEDURE — 93010 ELECTROCARDIOGRAM REPORT: CPT | Performed by: INTERNAL MEDICINE

## 2023-12-27 PROCEDURE — 1036F TOBACCO NON-USER: CPT | Performed by: INTERNAL MEDICINE

## 2023-12-27 PROCEDURE — 99204 OFFICE O/P NEW MOD 45 MIN: CPT | Performed by: INTERNAL MEDICINE

## 2023-12-27 PROCEDURE — 3080F DIAST BP >= 90 MM HG: CPT | Performed by: INTERNAL MEDICINE

## 2023-12-27 PROCEDURE — 3075F SYST BP GE 130 - 139MM HG: CPT | Performed by: INTERNAL MEDICINE

## 2023-12-27 PROCEDURE — 1125F AMNT PAIN NOTED PAIN PRSNT: CPT | Performed by: INTERNAL MEDICINE

## 2023-12-27 PROCEDURE — 93005 ELECTROCARDIOGRAM TRACING: CPT | Performed by: INTERNAL MEDICINE

## 2023-12-27 RX ORDER — METOPROLOL SUCCINATE 50 MG/1
50 TABLET, EXTENDED RELEASE ORAL DAILY
Qty: 90 TABLET | Refills: 3 | Status: SHIPPED | OUTPATIENT
Start: 2023-12-27 | End: 2024-01-04 | Stop reason: SDUPTHER

## 2023-12-27 ASSESSMENT — ENCOUNTER SYMPTOMS
OCCASIONAL FEELINGS OF UNSTEADINESS: 0
LOSS OF SENSATION IN FEET: 0
DEPRESSION: 0

## 2023-12-27 NOTE — PROGRESS NOTES
"Chief Complaint:   Atrial Fibrillation and Hypertension     History Of Present Illness:    Elias Mayorga is a 73 y.o. female here regarding atrial fibrillation. She has a history of hypertension, Hyperlipidemia, OA, CVA, class III obesity, and paroxysmal atrial fibrillation. She also had a positive stress test in 2020 with an unremarkable subsequent coronary CT angiography.    She reports doing well. She was hospitalized 10/31/2023 for atrial fibrillation with RVR noted prior to a scheduled colonoscopy. It was thought that a degree of dehydration from her endoscopy prep was contributing. She was started on metoprolol in addition to her Cardizem but was not discharged on it. She denies palpitations or other cardiovascular complaints.            Last Recorded Vitals:  Vitals:    12/27/23 1014   BP: 132/90   BP Location: Right arm   Patient Position: Sitting   Pulse: 105   SpO2: 98%   Weight: 91.6 kg (202 lb)   Height: 1.499 m (4' 11\")             Allergies:  Penicillins    Outpatient Medications:  Current Outpatient Medications   Medication Instructions    acetaminophen (TYLENOL) 650 mg, oral, Every 6 hours PRN    apixaban (ELIQUIS) 5 mg, oral, Every 12 hours    atorvastatin (LIPITOR) 80 mg, oral, Nightly    CALCIUM CARBONATE-VITAMIN D3 ORAL TAKE AS DIRECTED.    cholecalciferol (Vitamin D-3) 25 MCG (1000 UT) tablet 1 tablet, oral, Daily    coenzyme Q-10 10 mg, oral, Daily    dilTIAZem XR (DILACOR XR) 180 mg, oral, Daily    dilTIAZem XR (DILT-XR) 120 mg, oral, Daily    multivit-iron-minerals-folic acid (Centrum Silver) 0.4 mg-300 mcg- 250 mcg tab 1 tablet, oral, Daily    spironolactone (ALDACTONE) 12.5 mg, oral, Daily         Physical Exam:  Gen Well nourished middle aged female sitting up in NAD. Body mass index is 40.8 kg/m².  Eyes sclera nonicteric. Pupils normal size and symmetric.  Neck supple with no masses or thyromegaly.  CV rirregularly irregular. No m/r/g appreciated. No JVD or leg edema. Pulses 2+ and " symmetric. No abdominal aortic aneurysm or carotid bruits appreciated.  Pulm Lungs clear with normal respiratory effort.  Skin No bruises rashes or jaundice. No induration or nodules.  Psych Appropriate affect and mood. Normal judgement and insight.  Neuro Alert and conversant. Grossly nonfocal.       I reviewed the patient's ECG -  AF @ 105 bpm.     I reviewed most recent imaging / labs / and office notes as well as details of any recent hospitalizations or ED visits.    Assessment/Plan   1. Atrial fibrillation  Paroxysmal though perhaps persistent. Rates suboptimal on diltiazem. Adding Toprol XL. On Eliquis for AC.    2 Hypertension   Blood pressure variable; acceptable overall. Adding Toprol regardless as above.    3. Dyslipidemia   On statin with last lipid profile acceptable.         Followup 3 mos      Chart data reviewed including review of laboratory studies, prior office notes, and imaging results. ECG independently interpreted. Total evaluation time of 45 minutes, office visit inclusive.       Carlos Montano MD

## 2023-12-31 LAB
ATRIAL RATE: 340 BPM
Q ONSET: 225 MS
QRS COUNT: 18 BEATS
QRS DURATION: 82 MS
QT INTERVAL: 260 MS
QTC CALCULATION(BAZETT): 343 MS
QTC FREDERICIA: 313 MS
R AXIS: 67 DEGREES
T AXIS: 63 DEGREES
T OFFSET: 355 MS
VENTRICULAR RATE: 105 BPM

## 2024-01-04 ENCOUNTER — OFFICE VISIT (OUTPATIENT)
Dept: PRIMARY CARE | Facility: CLINIC | Age: 74
End: 2024-01-04
Payer: MEDICARE

## 2024-01-04 ENCOUNTER — LAB (OUTPATIENT)
Dept: LAB | Facility: LAB | Age: 74
End: 2024-01-04
Payer: MEDICARE

## 2024-01-04 ENCOUNTER — TELEPHONE (OUTPATIENT)
Dept: PRIMARY CARE | Facility: CLINIC | Age: 74
End: 2024-01-04

## 2024-01-04 VITALS — TEMPERATURE: 98.6 F | BODY MASS INDEX: 40.96 KG/M2 | WEIGHT: 202.8 LBS

## 2024-01-04 DIAGNOSIS — Z12.11 COLON CANCER SCREENING: ICD-10-CM

## 2024-01-04 DIAGNOSIS — I10 PRIMARY HYPERTENSION: ICD-10-CM

## 2024-01-04 DIAGNOSIS — Z71.89 ADVANCED CARE PLANNING/COUNSELING DISCUSSION: ICD-10-CM

## 2024-01-04 DIAGNOSIS — E55.9 VITAMIN D DEFICIENCY: ICD-10-CM

## 2024-01-04 DIAGNOSIS — E78.5 DYSLIPIDEMIA: ICD-10-CM

## 2024-01-04 DIAGNOSIS — E66.01 OBESITY, MORBID (MORE THAN 100 LBS OVER IDEAL WEIGHT OR BMI > 40) (MULTI): ICD-10-CM

## 2024-01-04 DIAGNOSIS — I48.19 PERSISTENT ATRIAL FIBRILLATION (MULTI): ICD-10-CM

## 2024-01-04 DIAGNOSIS — I63.9 CEREBROVASCULAR ACCIDENT (CVA), UNSPECIFIED MECHANISM (MULTI): ICD-10-CM

## 2024-01-04 DIAGNOSIS — I10 HTN (HYPERTENSION), BENIGN: ICD-10-CM

## 2024-01-04 DIAGNOSIS — I48.19 PERSISTENT ATRIAL FIBRILLATION (MULTI): Primary | ICD-10-CM

## 2024-01-04 PROBLEM — I48.91 ATRIAL FIBRILLATION WITH RAPID VENTRICULAR RESPONSE (MULTI): Status: RESOLVED | Noted: 2023-10-31 | Resolved: 2024-01-04

## 2024-01-04 LAB
25(OH)D3 SERPL-MCNC: 86 NG/ML (ref 30–100)
ERYTHROCYTE [DISTWIDTH] IN BLOOD BY AUTOMATED COUNT: 13.2 % (ref 11.5–14.5)
HCT VFR BLD AUTO: 40.6 % (ref 36–46)
HGB BLD-MCNC: 13 G/DL (ref 12–16)
MCH RBC QN AUTO: 26.9 PG (ref 26–34)
MCHC RBC AUTO-ENTMCNC: 32 G/DL (ref 32–36)
MCV RBC AUTO: 84 FL (ref 80–100)
NRBC BLD-RTO: 0 /100 WBCS (ref 0–0)
PLATELET # BLD AUTO: 334 X10*3/UL (ref 150–450)
POC HDL CHOLESTEROL: 29 MG/DL (ref 0–50)
POC LDL CHOLESTEROL: 79 MG/DL (ref 0–100)
POC NON-HDL CHOLESTEROL: 92 MG/DL (ref 0–130)
POC TOTAL CHOLESTEROL/HDL RATIO: 4.1 (ref 0–4.5)
POC TOTAL CHOLESTEROL: 121 MG/DL (ref 0–199)
POC TRIGLYCERIDES: 65 MG/DL (ref 0–150)
RBC # BLD AUTO: 4.84 X10*6/UL (ref 4–5.2)
WBC # BLD AUTO: 7.2 X10*3/UL (ref 4.4–11.3)

## 2024-01-04 PROCEDURE — 80053 COMPREHEN METABOLIC PANEL: CPT

## 2024-01-04 PROCEDURE — 85027 COMPLETE CBC AUTOMATED: CPT

## 2024-01-04 PROCEDURE — 36415 COLL VENOUS BLD VENIPUNCTURE: CPT

## 2024-01-04 PROCEDURE — 80061 LIPID PANEL: CPT | Performed by: INTERNAL MEDICINE

## 2024-01-04 PROCEDURE — 1159F MED LIST DOCD IN RCRD: CPT | Performed by: INTERNAL MEDICINE

## 2024-01-04 PROCEDURE — G0439 PPPS, SUBSEQ VISIT: HCPCS | Performed by: INTERNAL MEDICINE

## 2024-01-04 PROCEDURE — 99497 ADVNCD CARE PLAN 30 MIN: CPT | Performed by: INTERNAL MEDICINE

## 2024-01-04 PROCEDURE — 1036F TOBACCO NON-USER: CPT | Performed by: INTERNAL MEDICINE

## 2024-01-04 PROCEDURE — 99214 OFFICE O/P EST MOD 30 MIN: CPT | Performed by: INTERNAL MEDICINE

## 2024-01-04 PROCEDURE — 1160F RVW MEDS BY RX/DR IN RCRD: CPT | Performed by: INTERNAL MEDICINE

## 2024-01-04 PROCEDURE — 84443 ASSAY THYROID STIM HORMONE: CPT

## 2024-01-04 PROCEDURE — 1125F AMNT PAIN NOTED PAIN PRSNT: CPT | Performed by: INTERNAL MEDICINE

## 2024-01-04 PROCEDURE — 82306 VITAMIN D 25 HYDROXY: CPT

## 2024-01-04 RX ORDER — METOPROLOL SUCCINATE 50 MG/1
50 TABLET, EXTENDED RELEASE ORAL DAILY
Qty: 90 TABLET | Refills: 3 | Status: SHIPPED | OUTPATIENT
Start: 2024-01-04 | End: 2025-01-03

## 2024-01-04 RX ORDER — DILTIAZEM HYDROCHLORIDE 180 MG/1
180 CAPSULE, EXTENDED RELEASE ORAL DAILY
Qty: 90 CAPSULE | Refills: 3 | Status: SHIPPED | OUTPATIENT
Start: 2024-01-04 | End: 2025-01-03

## 2024-01-04 RX ORDER — DILTIAZEM HYDROCHLORIDE 120 MG/1
120 CAPSULE, EXTENDED RELEASE ORAL DAILY
Qty: 90 CAPSULE | Refills: 3 | Status: SHIPPED | OUTPATIENT
Start: 2024-01-04 | End: 2024-12-29

## 2024-01-04 RX ORDER — ATORVASTATIN CALCIUM 80 MG/1
80 TABLET, FILM COATED ORAL NIGHTLY
Qty: 90 TABLET | Refills: 3 | Status: SHIPPED | OUTPATIENT
Start: 2024-01-04 | End: 2024-12-29

## 2024-01-04 RX ORDER — SPIRONOLACTONE 25 MG/1
12.5 TABLET ORAL DAILY
Qty: 15 TABLET | Refills: 3 | Status: SHIPPED | OUTPATIENT
Start: 2024-01-04 | End: 2024-04-19 | Stop reason: SDUPTHER

## 2024-01-04 NOTE — PROGRESS NOTES
"Elias Mayorga is a 73 y.o. female here for a Medicare Wellness Exam.    No chief complaint on file.       Patient with a past medical history of CVA, A fibrillation, HTN, HLD, Pedal edema,Shingles, CKD III, Colonoscopy due 2023, Mammograms with [facility]     Feels fine  No chest pain/  SOB/ dizziness  BM OK  Energy level ok  Appetite OK    No polyuria  No polydipsia  Nocturia            Medicare Wellness Exam    The patent is being seen for a follow up annual wellness visit  Past Medical, Surgical and family History: Reviewed and updated in chart  Interval History: Patient has not been hospitalized previously  Medications and Supplements: Review of all medications by a prescribing practitioner or clinical pharmacist (such as prescriptions, OTC, Herbal therapies and supplements) documented in the medical record.    Patient Self-Assessment of health: Fair  Tobacco Use: No  Alcohol Use: No  Illicit drug use: No  Patient using opioids: No    Current Diet: in general, a \"healthy\" diet    Adequate fluid intake: Yes  Caffeine intake: Yes  Exercise frequency: moderately active    Depression/Suicide screening: PHQ2/ PHQ9 (see screenings tab)    Hearing impairment: No  Uses hearing aids N/A  Cognitive impairment Observation: No   Patient or family reported cognitive impairment: No    Bathing: independent  Dressing: independent  Walking: independent  Taking Medications: independent  Feeding: independent  Personal Hygiene: independent  Managing Finances: independent  Shopping: independent  Housework/Basic Home Maintenance: independent  Handling transportation: independent  Preparing meals: independent    Bowels: continent  Bladder: continent    Falls Risk: has notfallen in last 6 months.   Their fall has not resulted in an injury.   Fall risk Factors: Fall Risk Factors:    none   Care Plan Risk: Care Plan: Low/Moderate Risk: Regular physical activity such as walking, water aerobics or johana chi to improve strength, balance, " coordination and flexibility. Wear appropriate, sensible shoe wear. Remove fall hazards at home such as loose rugs, obstacles, use non-slip surface in bath or shower. Keep living space well lit.      Home Safety Risk Factors: Home Safety Risk Factors: None  Advanced Directives:  Living will: No POA: No    Patient's End of Life Decisions: Provider agree to follow.      Past Medical History:   Diagnosis Date    Arthritis     Essential (primary) hypertension 11/11/2022    Hypertension    Hyperkalemia 12/01/2013    Hyperkalemia    Hyperlipidemia     Other conditions influencing health status 12/01/2013    Osteoarthritis Of Multiple Sites    Other persistent atrial fibrillation (CMS/HCC) 01/04/2019    Persistent atrial fibrillation    Stroke (CMS/[facility])     5 years ago        Review of Systems     Constitutional: no fever, no chills, not feeling poorly, not feeling tired and no recent weight gain, no recent weight loss.   ENT: no earache, no hearing loss, no nosebleeds, no nasal discharge, no sore throat and no hoarseness.   Cardiovascular: the heart rate was not slow, the heart rate was not fast, no chest pain, no palpitations, no intermittent leg claudication and no lower extremity edema.   Respiratory: no cough, wheezing or shortness of breath at rest or exertion  Gastrointestinal: no abdominal pain, no constipation, no melena, no nausea, no diarrhea, no vomiting and no blood in stools.   Musculoskeletal: no arthralgias, no myalgias, no back pain, no joint swelling, no joint stiffness, no limb pain and no limb swelling.   Integumentary: no skin rashes, no skin lesions, no itching, no skin wound and no dry skin.   Neurological: no headache, no confusion, no numbness, no dizziness, no tingling and no fainting.   All other systems have been reviewed and are negative for complaint.          Physical Exam     Constitutional   General appearance: Alert and in no acute distress.     Pulmonary   Respiratory assessment: No  respiratory distress, normal respiratory rhythm and effort.    Auscultation of Lungs: Clear bilateral breath sounds.   Cardiovascular   Auscultation of heart: Apical pulse normal, heart rate and rhythm normal, normal S1 and S2, no murmurs and no pericardial rub.    Exam for edema: No peripheral edema.   Abdomen   Abdominal Exam: No bruits, normal bowel sounds, soft, non-tender, no abdominal mass palpated.    Liver and Spleen exam: No hepato-splenomegaly.   Musculoskeletal   Examination of gait: Normal.    Inspection of digits and nails: No clubbing or cyanosis of the fingernails.    Inspection/palpation of joints, bones and muscles: No joint swelling. Normal movement of all extremities.   Skin   Skin inspection: Normal skin color and pigmentation, normal skin turgor and no visible rash.   Neurologic   Cranial nerves: Nerves 2-12 were intact, no focal neuro defects.          Assessment/Plan          Patient with a past medical history of CVA, A fibrillation, HTN, HLD, Pedal edema,Shingles, CKD III, Colonoscopy due 2023, Mammograms with [facility]     # HTN/ CKD III  Stable  Continue current medications    # Afib  Stable    # HLD  Stable  Continue current medications  Watch salt, avoid excessive caffeine  Avoid processed meats/ sugars/juices Instead eat fresh fruit  Add walnuts and almonds to your diet  exercise 6 days a week for 30 minutes    # Hx of CVA(2018)  Stable  Continue statins / Eliquis    # Morbid Obesity  Diet/ 1600 zulay/ exercise    Colonoscopy scheduled    Discussed getting Health Care Power of  and Living Will Documents and their importance. These are legal documents obtained through a . Total Time spent in this discussion was less than 30 minutes

## 2024-01-06 LAB
ALBUMIN SERPL BCP-MCNC: 3.9 G/DL (ref 3.4–5)
ALP SERPL-CCNC: 87 U/L (ref 33–136)
ALT SERPL W P-5'-P-CCNC: 61 U/L (ref 7–45)
ANION GAP SERPL CALC-SCNC: 21 MMOL/L (ref 10–20)
AST SERPL W P-5'-P-CCNC: 26 U/L (ref 9–39)
BILIRUB SERPL-MCNC: 0.6 MG/DL (ref 0–1.2)
BUN SERPL-MCNC: 13 MG/DL (ref 6–23)
CALCIUM SERPL-MCNC: 9.8 MG/DL (ref 8.6–10.6)
CHLORIDE SERPL-SCNC: 99 MMOL/L (ref 98–107)
CO2 SERPL-SCNC: 24 MMOL/L (ref 21–32)
CREAT SERPL-MCNC: 0.98 MG/DL (ref 0.5–1.05)
GFR SERPL CREATININE-BSD FRML MDRD: 62 ML/MIN/1.73M*2
GLUCOSE SERPL-MCNC: 102 MG/DL (ref 74–99)
POTASSIUM SERPL-SCNC: 4 MMOL/L (ref 3.5–5.3)
PROT SERPL-MCNC: 8 G/DL (ref 6.4–8.2)
SODIUM SERPL-SCNC: 140 MMOL/L (ref 136–145)
TSH SERPL-ACNC: 0.54 MIU/L (ref 0.44–3.98)

## 2024-01-18 DIAGNOSIS — Z12.11 COLON CANCER SCREENING: ICD-10-CM

## 2024-01-20 ENCOUNTER — TELEPHONE (OUTPATIENT)
Dept: GASTROENTEROLOGY | Facility: HOSPITAL | Age: 74
End: 2024-01-20
Payer: MEDICARE

## 2024-01-20 DIAGNOSIS — Z12.11 COLON CANCER SCREENING: Primary | ICD-10-CM

## 2024-01-20 RX ORDER — POLYETHYLENE GLYCOL 3350, SODIUM CHLORIDE, SODIUM BICARBONATE, POTASSIUM CHLORIDE 420; 11.2; 5.72; 1.48 G/4L; G/4L; G/4L; G/4L
4000 POWDER, FOR SOLUTION ORAL ONCE
Qty: 4000 ML | Refills: 0 | Status: SHIPPED | OUTPATIENT
Start: 2024-01-20 | End: 2024-01-20

## 2024-01-20 NOTE — TELEPHONE ENCOUNTER
----- Message from Kathy Dhillon sent at 1/19/2024  1:07 PM EST -----  Regarding: Prep request  Pt scheduled colonoscopy with Dr Jennings on 05/07. Can you please send prep to [facility] [phone]    Thank you

## 2024-01-30 ENCOUNTER — PATIENT OUTREACH (OUTPATIENT)
Dept: CARE COORDINATION | Facility: CLINIC | Age: 74
End: 2024-01-30
Payer: MEDICARE

## 2024-03-20 ENCOUNTER — OFFICE VISIT (OUTPATIENT)
Dept: CARDIOLOGY | Facility: HOSPITAL | Age: 74
End: 2024-03-20
Payer: MEDICARE

## 2024-03-20 VITALS
WEIGHT: 203 LBS | HEART RATE: 98 BPM | BODY MASS INDEX: 41 KG/M2 | SYSTOLIC BLOOD PRESSURE: 122 MMHG | DIASTOLIC BLOOD PRESSURE: 81 MMHG

## 2024-03-20 DIAGNOSIS — E78.5 DYSLIPIDEMIA: ICD-10-CM

## 2024-03-20 DIAGNOSIS — I10 PRIMARY HYPERTENSION: ICD-10-CM

## 2024-03-20 DIAGNOSIS — I48.19 PERSISTENT ATRIAL FIBRILLATION (MULTI): Primary | ICD-10-CM

## 2024-03-20 PROCEDURE — 1036F TOBACCO NON-USER: CPT | Performed by: INTERNAL MEDICINE

## 2024-03-20 PROCEDURE — 93005 ELECTROCARDIOGRAM TRACING: CPT | Performed by: INTERNAL MEDICINE

## 2024-03-20 PROCEDURE — 1160F RVW MEDS BY RX/DR IN RCRD: CPT | Performed by: INTERNAL MEDICINE

## 2024-03-20 PROCEDURE — 3074F SYST BP LT 130 MM HG: CPT | Performed by: INTERNAL MEDICINE

## 2024-03-20 PROCEDURE — 3079F DIAST BP 80-89 MM HG: CPT | Performed by: INTERNAL MEDICINE

## 2024-03-20 PROCEDURE — 99213 OFFICE O/P EST LOW 20 MIN: CPT | Performed by: INTERNAL MEDICINE

## 2024-03-20 PROCEDURE — 1159F MED LIST DOCD IN RCRD: CPT | Performed by: INTERNAL MEDICINE

## 2024-03-20 ASSESSMENT — ENCOUNTER SYMPTOMS
DEPRESSION: 0
LOSS OF SENSATION IN FEET: 0
OCCASIONAL FEELINGS OF UNSTEADINESS: 0

## 2024-03-20 NOTE — PROGRESS NOTES
Chief Complaint:   Follow-up (A-fib/HTN/DLD)     History Of Present Illness:    Elias Maoyrga is a 73 y.o. female here regarding atrial fibrillation. She has a history of hypertension, hyperlipidemia, OA, CVA, class III obesity, and paroxysmal atrial fibrillation. She also had a positive stress test in 2020 with an unremarkable subsequent coronary CT angiography.    She reports doing well. Notes lightheadedness if she takes her AV blockers at the same time but has been tolerating it taking it separately. She otherwise reports no complaints. Does not exercise. She is pending a colonoscopy in May.            Last Recorded Vitals:  Vitals:    03/20/24 1004   BP: 122/81   BP Location: Left arm   Patient Position: Sitting   Pulse: 98   Weight: 92.1 kg (203 lb)             Allergies:  Penicillins    Outpatient Medications:  Current Outpatient Medications   Medication Instructions    acetaminophen (TYLENOL) 650 mg, oral, Every 6 hours PRN    apixaban (ELIQUIS) 5 mg, oral, Every 12 hours    atorvastatin (LIPITOR) 80 mg, oral, Nightly    CALCIUM CARBONATE-VITAMIN D3 ORAL TAKE AS DIRECTED.    cholecalciferol (Vitamin D-3) 25 MCG (1000 UT) tablet 1 tablet, oral, Daily    coenzyme Q-10 10 mg, oral, Daily    dilTIAZem XR (DILACOR XR) 180 mg, oral, Daily    dilTIAZem XR (DILT-XR) 120 mg, oral, Daily    metoprolol succinate XL (TOPROL-XL) 50 mg, oral, Daily, Do not crush or chew.    multivit-iron-minerals-folic acid (Centrum Silver) 0.4 mg-300 mcg- 250 mcg tab 1 tablet, oral, Daily    spironolactone (ALDACTONE) 12.5 mg, oral, Daily         Physical Exam:  Gen Well nourished septuagenarian female sitting up in NAD. Body mass index is 41 kg/m².  Eyes sclera nonicteric. Pupils normal size and symmetric.  Neck supple with no masses or thyromegaly.  CV rirregularly irregular. No m/r/g appreciated. No JVD or leg edema. Pulses 2+ and symmetric. No abdominal aortic aneurysm or carotid bruits appreciated.  Pulm Lungs clear with normal  respiratory effort.  Skin No bruises rashes or jaundice. No induration or nodules.  Psych Appropriate affect and mood. Normal judgement and insight.  Neuro Alert and conversant. Grossly nonfocal.       I reviewed the patient's ECG -  Aflutter with variable block @ 98 bpm.     I reviewed most recent imaging / labs / and office notes.        Assessment/Plan   1. Atrial fibrillation/flutter  Paroxysmal though more persistent as of late (stage IIIb). Rates acceptable on dual AV blockade. On Eliquis for AC which she can hold for two days before her colonoscopy and resume as soon as it is felt safe from a post procedure stand point (preferably no longer than a week total off the medication).    2 Hypertension   Blood pressure well controlled today. Her present regimen is to continue. Exercise encouraged.    3. Dyslipidemia   On statin with last lipid profile acceptable.         Followup 6 mos         Carlos Montano MD

## 2024-03-21 LAB
ATRIAL RATE: 312 BPM
Q ONSET: 226 MS
QRS COUNT: 16 BEATS
QRS DURATION: 70 MS
QT INTERVAL: 324 MS
QTC CALCULATION(BAZETT): 413 MS
QTC FREDERICIA: 381 MS
R AXIS: 44 DEGREES
T AXIS: 12 DEGREES
T OFFSET: 388 MS
VENTRICULAR RATE: 98 BPM

## 2024-04-19 ENCOUNTER — OFFICE VISIT (OUTPATIENT)
Dept: PRIMARY CARE | Facility: CLINIC | Age: 74
End: 2024-04-19
Payer: MEDICARE

## 2024-04-19 VITALS
HEART RATE: 68 BPM | DIASTOLIC BLOOD PRESSURE: 70 MMHG | RESPIRATION RATE: 16 BRPM | TEMPERATURE: 98.3 F | SYSTOLIC BLOOD PRESSURE: 120 MMHG | WEIGHT: 206.8 LBS | BODY MASS INDEX: 41.77 KG/M2

## 2024-04-19 DIAGNOSIS — E78.5 DYSLIPIDEMIA: ICD-10-CM

## 2024-04-19 DIAGNOSIS — I10 PRIMARY HYPERTENSION: ICD-10-CM

## 2024-04-19 DIAGNOSIS — I48.19 PERSISTENT ATRIAL FIBRILLATION (MULTI): ICD-10-CM

## 2024-04-19 DIAGNOSIS — I10 PRIMARY HYPERTENSION: Primary | ICD-10-CM

## 2024-04-19 PROCEDURE — 1160F RVW MEDS BY RX/DR IN RCRD: CPT | Performed by: INTERNAL MEDICINE

## 2024-04-19 PROCEDURE — 99214 OFFICE O/P EST MOD 30 MIN: CPT | Performed by: INTERNAL MEDICINE

## 2024-04-19 PROCEDURE — 1159F MED LIST DOCD IN RCRD: CPT | Performed by: INTERNAL MEDICINE

## 2024-04-19 PROCEDURE — 3074F SYST BP LT 130 MM HG: CPT | Performed by: INTERNAL MEDICINE

## 2024-04-19 PROCEDURE — 3078F DIAST BP <80 MM HG: CPT | Performed by: INTERNAL MEDICINE

## 2024-04-19 PROCEDURE — 1036F TOBACCO NON-USER: CPT | Performed by: INTERNAL MEDICINE

## 2024-04-19 RX ORDER — SPIRONOLACTONE 25 MG/1
12.5 TABLET ORAL DAILY
Qty: 45 TABLET | Refills: 3 | Status: SHIPPED | OUTPATIENT
Start: 2024-04-19 | End: 2024-04-19 | Stop reason: SDUPTHER

## 2024-04-19 RX ORDER — SPIRONOLACTONE 25 MG/1
12.5 TABLET ORAL DAILY
Qty: 45 TABLET | Refills: 3 | Status: SHIPPED | OUTPATIENT
Start: 2024-04-19 | End: 2025-04-14

## 2024-04-19 NOTE — PROGRESS NOTES
Elias Mayorga is a 73 y.o. female   Patient with a past medical history of CVA, A fibrillation, HTN, HLD, Pedal edema,Shingles, CKD III, Colonoscopy due 2023, Mammograms with [facility]     Feels fine  No chest pain/  SOB/ dizziness  BM OK  Energy level ok  Appetite OK             Review of Systems     Constitutional: no fever, no chills, not feeling poorly, not feeling tired and no recent weight gain, no recent weight loss.   ENT: no earache, no hearing loss, no nosebleeds, no nasal discharge, no sore throat and no hoarseness.   Cardiovascular: the heart rate was not slow, the heart rate was not fast, no chest pain, no palpitations, no intermittent leg claudication and no lower extremity edema.   Respiratory: no cough, wheezing or shortness of breath at rest or exertion  Gastrointestinal: no abdominal pain, no constipation, no melena, no nausea, no diarrhea, no vomiting and no blood in stools.   Musculoskeletal: no arthralgias, no myalgias, no back pain, no joint swelling, no joint stiffness, no limb pain and no limb swelling.   Integumentary: no skin rashes, no skin lesions, no itching, no skin wound and no dry skin.   Neurological: no headache, no confusion, no numbness, no dizziness, no tingling and no fainting.   All other systems have been reviewed and are negative for complaint.       Vitals:    04/19/24 1037   BP: 120/70   Pulse: 68   Resp: 16   Temp: 36.8 °C (98.3 °F)        Physical Exam     Constitutional   General appearance: Alert and in no acute distress.     Pulmonary   Respiratory assessment: No respiratory distress, normal respiratory rhythm and effort.    Auscultation of Lungs: Clear bilateral breath sounds.   Cardiovascular   Auscultation of heart: Apical pulse normal, heart rate and rhythm normal, normal S1 and S2, no murmurs and no pericardial rub.    Exam for edema: trace edema  Abdomen   Abdominal Exam: No bruits, normal bowel sounds, soft, non-tender, no abdominal mass palpated.    Liver and  Spleen exam: No hepato-splenomegaly.   Musculoskeletal   Examination of gait: Normal.    Inspection of digits and nails: No clubbing or cyanosis of the fingernails.    Inspection/palpation of joints, bones and muscles: No joint swelling. Normal movement of all extremities.   Skin   Skin inspection: Normal skin color and pigmentation, normal skin turgor and no visible rash.   Neurologic   Cranial nerves: Nerves 2-12 were intact, no focal neuro defects.     Assessment/Plan          Patient with a past medical history of CVA, A fibrillation, HTN, HLD, Pedal edema,Shingles, CKD III, Colonoscopy due 2023, Mammograms with [facility]     # HTN/ CKD III  Stable  Continue current medications    # Paroxysmal A fib  Stable    # HLD  Stable  Continue current medications  Watch salt, avoid excessive caffeine  Avoid processed meats/ sugars/juices Instead eat fresh fruit  Add walnuts and almonds to your diet  exercise 6 days a week for 30 minutes    # Hx of CVA(2018)  Stable  Continue statins / Eliquis    # Morbid Obesity  Diet/ 1600 zulay/ exercise    Colonoscopy scheduled

## 2024-05-07 ENCOUNTER — ANESTHESIA EVENT (OUTPATIENT)
Dept: GASTROENTEROLOGY | Facility: HOSPITAL | Age: 74
End: 2024-05-07
Payer: MEDICARE

## 2024-05-07 ENCOUNTER — ANESTHESIA (OUTPATIENT)
Dept: GASTROENTEROLOGY | Facility: HOSPITAL | Age: 74
End: 2024-05-07
Payer: MEDICARE

## 2024-05-07 ENCOUNTER — HOSPITAL ENCOUNTER (OUTPATIENT)
Dept: GASTROENTEROLOGY | Facility: HOSPITAL | Age: 74
Discharge: HOME | End: 2024-05-07
Payer: MEDICARE

## 2024-05-07 VITALS
WEIGHT: 202.82 LBS | HEIGHT: 59 IN | TEMPERATURE: 97.2 F | RESPIRATION RATE: 16 BRPM | OXYGEN SATURATION: 95 % | DIASTOLIC BLOOD PRESSURE: 62 MMHG | HEART RATE: 90 BPM | BODY MASS INDEX: 40.89 KG/M2 | SYSTOLIC BLOOD PRESSURE: 100 MMHG

## 2024-05-07 DIAGNOSIS — D12.6 TUBULAR ADENOMA OF COLON: ICD-10-CM

## 2024-05-07 DIAGNOSIS — Z12.11 COLON CANCER SCREENING: Primary | ICD-10-CM

## 2024-05-07 PROCEDURE — 7100000010 HC PHASE TWO TIME - EACH INCREMENTAL 1 MINUTE

## 2024-05-07 PROCEDURE — 7100000009 HC PHASE TWO TIME - INITIAL BASE CHARGE

## 2024-05-07 PROCEDURE — 99100 ANES PT EXTEME AGE<1 YR&>70: CPT | Performed by: ANESTHESIOLOGY

## 2024-05-07 PROCEDURE — 2500000005 HC RX 250 GENERAL PHARMACY W/O HCPCS: Performed by: INTERNAL MEDICINE

## 2024-05-07 PROCEDURE — 0753T DGTZ GLS MCRSCP SLD LEVEL IV: CPT | Mod: TC,AHULAB | Performed by: INTERNAL MEDICINE

## 2024-05-07 PROCEDURE — 2500000004 HC RX 250 GENERAL PHARMACY W/ HCPCS (ALT 636 FOR OP/ED): Performed by: INTERNAL MEDICINE

## 2024-05-07 PROCEDURE — A45385 PR COLONOSCOPY,REMV LESN,SNARE: Performed by: ANESTHESIOLOGY

## 2024-05-07 PROCEDURE — 88305 TISSUE EXAM BY PATHOLOGIST: CPT | Performed by: PATHOLOGY

## 2024-05-07 PROCEDURE — 3700000002 HC GENERAL ANESTHESIA TIME - EACH INCREMENTAL 1 MINUTE

## 2024-05-07 PROCEDURE — A45385 PR COLONOSCOPY,REMV LESN,SNARE: Performed by: INTERNAL MEDICINE

## 2024-05-07 PROCEDURE — 45385 COLONOSCOPY W/LESION REMOVAL: CPT | Performed by: INTERNAL MEDICINE

## 2024-05-07 PROCEDURE — 3700000001 HC GENERAL ANESTHESIA TIME - INITIAL BASE CHARGE

## 2024-05-07 RX ORDER — ONDANSETRON HYDROCHLORIDE 2 MG/ML
INJECTION, SOLUTION INTRAVENOUS AS NEEDED
Status: DISCONTINUED | OUTPATIENT
Start: 2024-05-07 | End: 2024-05-07

## 2024-05-07 RX ORDER — METOPROLOL TARTRATE 1 MG/ML
INJECTION, SOLUTION INTRAVENOUS AS NEEDED
Status: DISCONTINUED | OUTPATIENT
Start: 2024-05-07 | End: 2024-05-07

## 2024-05-07 RX ORDER — PROPOFOL 10 MG/ML
INJECTION, EMULSION INTRAVENOUS AS NEEDED
Status: DISCONTINUED | OUTPATIENT
Start: 2024-05-07 | End: 2024-05-07

## 2024-05-07 RX ADMIN — METOPROLOL TARTRATE 5 MG: 5 INJECTION INTRAVENOUS at 14:26

## 2024-05-07 RX ADMIN — SODIUM CHLORIDE, POTASSIUM CHLORIDE, SODIUM LACTATE AND CALCIUM CHLORIDE: 600; 310; 30; 20 INJECTION, SOLUTION INTRAVENOUS at 14:15

## 2024-05-07 RX ADMIN — ONDANSETRON 4 MG: 2 INJECTION INTRAMUSCULAR; INTRAVENOUS at 14:26

## 2024-05-07 RX ADMIN — PROPOFOL 400 MG: 10 INJECTION, EMULSION INTRAVENOUS at 14:26

## 2024-05-07 RX ADMIN — METOPROLOL TARTRATE 5 MG: 5 INJECTION INTRAVENOUS at 14:41

## 2024-05-07 ASSESSMENT — COLUMBIA-SUICIDE SEVERITY RATING SCALE - C-SSRS
2. HAVE YOU ACTUALLY HAD ANY THOUGHTS OF KILLING YOURSELF?: NO
6. HAVE YOU EVER DONE ANYTHING, STARTED TO DO ANYTHING, OR PREPARED TO DO ANYTHING TO END YOUR LIFE?: NO
1. IN THE PAST MONTH, HAVE YOU WISHED YOU WERE DEAD OR WISHED YOU COULD GO TO SLEEP AND NOT WAKE UP?: NO

## 2024-05-07 ASSESSMENT — PAIN - FUNCTIONAL ASSESSMENT
PAIN_FUNCTIONAL_ASSESSMENT: 0-10

## 2024-05-07 ASSESSMENT — PAIN SCALES - GENERAL
PAINLEVEL_OUTOF10: 0 - NO PAIN

## 2024-05-07 NOTE — DISCHARGE INSTRUCTIONS

## 2024-05-07 NOTE — ANESTHESIA PREPROCEDURE EVALUATION
Patient: Elias Mayorga    Procedure Information       Date/Time: 05/07/24 1400    Scheduled providers: Rich Jennings MD; Ar Butler MD; Shaq Kent, TATI-CNP, APRANDREA-CRNA; Gemini Parmar RN    Procedure: COLONOSCOPY    Location: [facility]            Relevant Problems   Cardiac   (+) Hypertension   (+) Persistent atrial fibrillation (Multi)      Neuro   (+) CVA (cerebrovascular accident) (Multi)      Endocrine   (+) Obesity, morbid (more than 100 lbs over ideal weight or BMI > 40) (Multi)      Musculoskeletal   (+) Osteoarthrosis      HEENT   (+) Seasonal allergies      ID   (+) Shingles       Clinical information reviewed:   Tobacco  Allergies  Meds   Med Hx  Surg Hx  OB Status  Fam Hx  Soc   Hx         Past Medical History:   Diagnosis Date    Arthritis     Atrial fibrillation (Multi)     Essential (primary) hypertension     HLD (hyperlipidemia)     Hyperlipidemia     Stroke (Multi) 2018      Past Surgical History:   Procedure Laterality Date    CARDIOVERSION  2020    COLONOSCOPY      CT ANGIO CORONARY ART WITH HEARTFLOW IF SCORE >30%  09/24/2020    CT HEART CORONARY ANGIOGRAM 9/24/2020 [facility] CLINICAL LEGACY    MR HEAD ANGIO WO IV CONTRAST  12/28/2018    MR HEAD ANGIO WO IV CONTRAST 12/28/2018 [facility] CLINICAL LEGACY    MR NECK ANGIO WO IV CONTRAST  12/28/2018    MR NECK ANGIO WO IV CONTRAST 12/28/2018 [facility] CLINICAL LEGACY    TUBAL LIGATION       Social History     Tobacco Use    Smoking status: Never     Passive exposure: Never    Smokeless tobacco: Never   Vaping Use    Vaping status: Never Used   Substance Use Topics    Alcohol use: Never    Drug use: Never      Current Outpatient Medications   Medication Instructions    acetaminophen (TYLENOL) 650 mg, oral, Every 6 hours PRN    apixaban (ELIQUIS) 5 mg, oral, Every 12 hours    atorvastatin (LIPITOR) 80 mg, oral, Nightly    CALCIUM CARBONATE-VITAMIN D3 ORAL TAKE AS DIRECTED.    cholecalciferol (Vitamin D-3) 25 MCG (1000 UT) tablet 1  "tablet, oral, Daily    coenzyme Q-10 10 mg, oral, Daily    dilTIAZem XR (DILACOR XR) 180 mg, oral, Daily    dilTIAZem XR (DILT-XR) 120 mg, oral, Daily    metoprolol succinate XL (TOPROL-XL) 50 mg, oral, Daily, Do not crush or chew.    multivit-iron-minerals-folic acid (Centrum Silver) 0.4 mg-300 mcg- 250 mcg tab 1 tablet, oral, Daily    spironolactone (ALDACTONE) 12.5 mg, oral, Daily      Allergies   Allergen Reactions    Penicillins Hives        Chemistry    Lab Results   Component Value Date/Time     01/04/2024 1039    K 4.0 01/04/2024 1039    CL 99 01/04/2024 1039    CO2 24 01/04/2024 1039    BUN 13 01/04/2024 1039    CREATININE 0.98 01/04/2024 1039    Lab Results   Component Value Date/Time    CALCIUM 9.8 01/04/2024 1039    ALKPHOS 87 01/04/2024 1039    AST 26 01/04/2024 1039    ALT 61 (H) 01/04/2024 1039    BILITOT 0.6 01/04/2024 1039          Lab Results   Component Value Date    HGBA1C 5.6 12/28/2018     Lab Results   Component Value Date/Time    WBC 7.2 01/04/2024 1039    HGB 13.0 01/04/2024 1039    HCT 40.6 01/04/2024 1039     01/04/2024 1039     Lab Results   Component Value Date/Time    PROTIME 16.7 (H) 09/22/2020 0047    INR 1.4 (H) 09/22/2020 0047     No results found for: \"ABORH\"  Encounter Date: 03/20/24   ECG 12 Lead   Result Value    Ventricular Rate 98    Atrial Rate 312    QRS Duration 70    QT Interval 324    QTC Calculation(Bazett) 413    R Axis 44    T Axis 12    QRS Count 16    Q Onset 226    T Offset 388    QTC Fredericia 381    Narrative    Atrial flutter with variable AV block  Abnormal ECG  When compared with ECG of 27-DEC-2023 10:24,  No significant change was found    Confirmed by Carlos Montano ([id number]) on 3/21/2024 5:34:05 PM     No results found for this or any previous visit from the past 1095 days.   Echo 11/1/2023:  Left Ventricle: The left ventricular systolic function is normal, with an estimated ejection fraction of 55%. There are no regional wall motion " abnormalities. The left ventricular cavity size is normal. There is left ventricular concentric remodeling. Left ventricular diastolic filling was not assessed.  Left Atrium: The left atrium is normal in size.  Right Ventricle: The right ventricle is normal in size. There is low normal right ventricular systolic function.  Right Atrium: The right atrium is normal in size.  Aortic Valve: The aortic valve is trileaflet. There is mild to moderate aortic valve cusp calcification. There is no evidence of aortic valve regurgitation. The peak instantaneous gradient of the aortic valve is 3.2 mmHg. The mean gradient of the aortic valve is 2.0 mmHg.  Mitral Valve: The mitral valve is normal in structure. There is no evidence of mitral valve regurgitation.  Tricuspid Valve: The tricuspid valve is structurally normal. There is trace to mild tricuspid regurgitation. The Doppler estimated RVSP is slightly elevated at 32.4 mmHg.  Pulmonic Valve: The pulmonic valve is structurally normal. There is no indication of pulmonic valve regurgitation.  Pericardium: There is no pericardial effusion noted.  Aorta: The aortic root is normal.  Systemic Veins: The inferior vena cava appears to be of normal size.  In comparison to the previous echocardiogram(s): Compared with study from 12/28/2018, there was a paroxysmal tachycardia on the prior study which makes comparison of ejection fraction less meaningful. With that caveat, the ejection fraction herein is comparatively lower.     CONCLUSIONS:   1. Left ventricular systolic function is normal with a 55% estimated ejection fraction.   2. There is low normal right ventricular systolic function.   3. Slightly elevated RVSP.    Nuclear stress 9/22/2020:  FINDINGS:  There is a small to moderate-sized territory of mild perfusion defect  within the mid to distal inferior wall on stress images which  normalizes at rest.     Otherwise, both stress and rest studies demonstrate grossly  "normal  perfusion throughout the remainder of the left ventricle.     The left ventricle is normal in size.     Rest and stress gated images demonstrate normal LV wall motion with  an LV EF estimated at greater than 65%.     Attenuation correction CT images demonstrate no gross anatomic  abnormalities.     IMPRESSION:  1. Small to moderate territory of mild ischemia within the mid to  distal inferior wall which normalizes at rest without evidence of  prior infarction in this territory..  2. There is normal myocardial perfusion throughout the remainder of  the left ventricle without other evidence of ischemia or prior  infarction.  3. The left ventricle is normal in size.  4. Normal LV wall motion with an LV EF estimated at greater than 65%.    CT Coronary 9/24/2020:  IMPRESSION:  1.  Normal coronary anatomy with a right-dominant system without  evidence of significant atherosclerotic changes or stenotic disease.  2. Mild aortic valve sclerosis.  3. Mild left atrial dilation.    Visit Vitals  /88   Pulse (!) 122 Comment: provider aware   Temp 36.2 °C (97.2 °F) (Temporal)   Resp 15   Ht 1.499 m (4' 11\")   Wt 92 kg (202 lb 13.2 oz)   SpO2 95%   Breastfeeding No   BMI 40.97 kg/m²   OB Status Postmenopausal   Smoking Status Never   BSA 1.96 m²     NPO/Void Status  Carbohydrate Drink Given Prior to Surgery? : N  Date of Last Liquid: 05/07/24  Time of Last Liquid: 0900  Date of Last Solid: 05/06/24  Time of Last Solid: 0800  Last Intake Type: Clear fluids (water)  Time of Last Void: 1300         Physical Exam    Airway  Mallampati: III  TM distance: >3 FB  Neck ROM: full     Cardiovascular   Rhythm: irregular  Rate: normal     Dental - normal exam     Pulmonary   Breath sounds clear to auscultation     Abdominal - normal exam       Other findings: In afib           Anesthesia Plan    History of general anesthesia?: yes  History of complications of general anesthesia?: no    ASA 3     MAC   (With standard ASA " monitoring.)  intravenous induction   Anesthetic plan and risks discussed with patient.    Plan discussed with CRNA and CAA.

## 2024-05-07 NOTE — ANESTHESIA POSTPROCEDURE EVALUATION
Patient: Elias Mayorga    Procedure Summary       Date: 05/07/24 Room / Location: [facility]    Anesthesia Start: 1423 Anesthesia Stop: 1502    Procedure: COLONOSCOPY Diagnosis:       Tubular adenoma of colon      Colon cancer screening    Scheduled Providers: Rich Jennings MD; Ar Butler MD; Shaq Kent, TATI-CNP, APRN-CRNA; Gemini Parmar RN Responsible Provider: Ar Butler MD    Anesthesia Type: MAC ASA Status: 3            Anesthesia Type: MAC    Vitals Value Taken Time   /62 05/07/24 1525   Temp 36.2 °C (97.2 °F) 05/07/24 1525   Pulse 90 05/07/24 1525   Resp 16 05/07/24 1525   SpO2 95 % 05/07/24 1525       Anesthesia Post Evaluation    Patient location during evaluation: PACU  Patient participation: complete - patient participated  Level of consciousness: awake and alert  Pain management: adequate  Airway patency: patent  Cardiovascular status: acceptable and hemodynamically stable  Respiratory status: acceptable, spontaneous ventilation and nonlabored ventilation  Hydration status: acceptable  Postoperative Nausea and Vomiting: none        There were no known notable events for this encounter.

## 2024-05-08 ASSESSMENT — PAIN SCALES - GENERAL: PAINLEVEL_OUTOF10: 0 - NO PAIN

## 2024-05-13 LAB
LABORATORY COMMENT REPORT: NORMAL
PATH REPORT.FINAL DX SPEC: NORMAL
PATH REPORT.GROSS SPEC: NORMAL
PATH REPORT.TOTAL CANCER: NORMAL

## 2024-05-13 NOTE — RESULT ENCOUNTER NOTE
A  [employer] message was sent to patient regarding the results and recommendations as follows:    Dear Ms. Mayorga,    I am writing you to inform you that the polyp that we removed from your colon revealed a benign, pre-cancerous polyp. The polyp did not have cancer in it. As you know, a repeat surveillance colonoscopy is recommended in 5 years' time.    Copies of the procedure and pathology reports were sent to your referring primary care physician.    If you have any questions regarding your colonoscopy and/or pathology results, please do not hesitate to contact me at [phone] or respond to the message I sent you.  Thank you for allowing us to participate in your medical care.    Sincerely,    Rich Jennings MD, MINDY  Chief Medical   [facility] [place]  Associate Chief and Director of Clinical Operations  Division of Gastroenterology and Liver Disease   Master Clinician  [facility]  Professor of Medicine  [facility]

## 2024-07-22 ENCOUNTER — APPOINTMENT (OUTPATIENT)
Dept: PRIMARY CARE | Facility: CLINIC | Age: 74
End: 2024-07-22
Payer: MEDICARE

## 2024-07-22 VITALS
BODY MASS INDEX: 42.62 KG/M2 | DIASTOLIC BLOOD PRESSURE: 70 MMHG | SYSTOLIC BLOOD PRESSURE: 120 MMHG | RESPIRATION RATE: 16 BRPM | HEART RATE: 70 BPM | TEMPERATURE: 98 F | WEIGHT: 211 LBS

## 2024-07-22 DIAGNOSIS — I48.19 PERSISTENT ATRIAL FIBRILLATION (MULTI): ICD-10-CM

## 2024-07-22 DIAGNOSIS — I10 PRIMARY HYPERTENSION: Primary | ICD-10-CM

## 2024-07-22 DIAGNOSIS — E78.5 DYSLIPIDEMIA: ICD-10-CM

## 2024-07-22 PROCEDURE — 3078F DIAST BP <80 MM HG: CPT | Performed by: INTERNAL MEDICINE

## 2024-07-22 PROCEDURE — 1159F MED LIST DOCD IN RCRD: CPT | Performed by: INTERNAL MEDICINE

## 2024-07-22 PROCEDURE — 99214 OFFICE O/P EST MOD 30 MIN: CPT | Performed by: INTERNAL MEDICINE

## 2024-07-22 PROCEDURE — 1036F TOBACCO NON-USER: CPT | Performed by: INTERNAL MEDICINE

## 2024-07-22 PROCEDURE — 3074F SYST BP LT 130 MM HG: CPT | Performed by: INTERNAL MEDICINE

## 2024-07-22 RX ORDER — ATORVASTATIN CALCIUM 80 MG/1
80 TABLET, FILM COATED ORAL NIGHTLY
Qty: 90 TABLET | Refills: 3 | Status: SHIPPED | OUTPATIENT
Start: 2024-07-22 | End: 2025-07-17

## 2024-07-22 NOTE — PROGRESS NOTES
Elisa Mayorga is a 73 y.o. female   Patient with a past medical history of CVA, A fibrillation, HTN, HLD, Pedal edema,Shingles, CKD III, Colonoscopy due 2029, Mammograms with [facility]     Feels fine  No chest pain/  SOB/ dizziness  BM OK  Energy level ok  Appetite OK             Review of Systems     Constitutional: no fever, no chills, not feeling poorly, not feeling tired and no recent weight gain, no recent weight loss.   ENT: no earache, no hearing loss, no nosebleeds, no nasal discharge, no sore throat and no hoarseness.   Cardiovascular: the heart rate was not slow, the heart rate was not fast, no chest pain, no palpitations, no intermittent leg claudication and no lower extremity edema.   Respiratory: no cough, wheezing or shortness of breath at rest or exertion  Gastrointestinal: no abdominal pain, no constipation, no melena, no nausea, no diarrhea, no vomiting and no blood in stools.   Musculoskeletal: no arthralgias, no myalgias, no back pain, no joint swelling, no joint stiffness, no limb pain and no limb swelling.   Integumentary: no skin rashes, no skin lesions, no itching, no skin wound and no dry skin.   Neurological: no headache, no confusion, no numbness, no dizziness, no tingling and no fainting.   All other systems have been reviewed and are negative for complaint.     Current Outpatient Medications   Medication Instructions    acetaminophen (TYLENOL) 650 mg, oral, Every 6 hours PRN    apixaban (ELIQUIS) 5 mg, oral, Every 12 hours    atorvastatin (LIPITOR) 80 mg, oral, Nightly    CALCIUM CARBONATE-VITAMIN D3 ORAL TAKE AS DIRECTED.    cholecalciferol (Vitamin D-3) 25 MCG (1000 UT) tablet 1 tablet, oral, Daily    coenzyme Q-10 10 mg, oral, Daily    dilTIAZem XR (DILACOR XR) 180 mg, oral, Daily    dilTIAZem XR (DILT-XR) 120 mg, oral, Daily    metoprolol succinate XL (TOPROL-XL) 50 mg, oral, Daily, Do not crush or chew.    multivit-iron-minerals-folic acid (Centrum Silver) 0.4 mg-300 mcg- 250 mcg tab 1  tablet, oral, Daily    spironolactone (ALDACTONE) 12.5 mg, oral, Daily         Vitals:    07/22/24 1029   BP: 120/70   Pulse: 70   Resp: 16   Temp: 36.7 °C (98 °F)        Physical Exam     Constitutional   General appearance: Alert and in no acute distress.   Obese  Pulmonary   Respiratory assessment: No respiratory distress, normal respiratory rhythm and effort.    Auscultation of Lungs: Clear bilateral breath sounds.   Cardiovascular   Auscultation of heart: Irregularly irregular   Exam for edema: No peripheral edema.   Abdomen   Abdominal Exam: No bruits, normal bowel sounds, soft, non-tender, no abdominal mass palpated.    Liver and Spleen exam: No hepato-splenomegaly.   Musculoskeletal   Examination of gait: Normal.    Inspection of digits and nails: No clubbing or cyanosis of the fingernails.    Inspection/palpation of joints, bones and muscles: No joint swelling. Normal movement of all extremities.   Skin   Skin inspection: Normal skin color and pigmentation, normal skin turgor and no visible rash.   Neurologic   Cranial nerves: Nerves 2-12 were intact, no focal neuro defects.    Assessment/Plan          Patient with a past medical history of CVA, A fibrillation, HTN, HLD, Pedal edema,Shingles, CKD III, Colonoscopy due 2029, Mammograms with [facility]      # HTN/ CKD III  Stable  Continue current medications     # Paroxysmal A fib  Currently in Afib  Stable     # HLD  Elevated LFT/ Sugar  Recheck CMP fasting    Continue current medications  Watch salt, avoid excessive caffeine  Avoid processed meats/ sugars/juices Instead eat fresh fruit  Add walnuts and almonds to your diet  exercise 6 days a week for 30 minutes     # Hx of CVA(2018)  Stable  Continue statins / Eliquis     # Morbid Obesity  Diet/ 1600 zulay/ exercise

## 2024-08-13 DIAGNOSIS — I48.19 PERSISTENT ATRIAL FIBRILLATION (MULTI): ICD-10-CM

## 2024-08-13 RX ORDER — DILTIAZEM HYDROCHLORIDE 120 MG/1
120 CAPSULE, EXTENDED RELEASE ORAL DAILY
Qty: 90 CAPSULE | Refills: 3 | Status: SHIPPED | OUTPATIENT
Start: 2024-08-13

## 2024-09-14 ENCOUNTER — LAB (OUTPATIENT)
Dept: LAB | Facility: LAB | Age: 74
End: 2024-09-14
Payer: MEDICARE

## 2024-09-14 DIAGNOSIS — I10 PRIMARY HYPERTENSION: ICD-10-CM

## 2024-09-14 DIAGNOSIS — E78.5 DYSLIPIDEMIA: ICD-10-CM

## 2024-09-14 LAB
ALBUMIN SERPL BCP-MCNC: 3.7 G/DL (ref 3.4–5)
ALP SERPL-CCNC: 67 U/L (ref 33–136)
ALT SERPL W P-5'-P-CCNC: 37 U/L (ref 7–45)
ANION GAP SERPL CALC-SCNC: 10 MMOL/L (ref 10–20)
AST SERPL W P-5'-P-CCNC: 28 U/L (ref 9–39)
BILIRUB SERPL-MCNC: 0.6 MG/DL (ref 0–1.2)
BUN SERPL-MCNC: 13 MG/DL (ref 6–23)
CALCIUM SERPL-MCNC: 9.4 MG/DL (ref 8.6–10.3)
CHLORIDE SERPL-SCNC: 102 MMOL/L (ref 98–107)
CO2 SERPL-SCNC: 31 MMOL/L (ref 21–32)
CREAT SERPL-MCNC: 0.9 MG/DL (ref 0.5–1.05)
EGFRCR SERPLBLD CKD-EPI 2021: 68 ML/MIN/1.73M*2
GLUCOSE SERPL-MCNC: 96 MG/DL (ref 74–99)
POTASSIUM SERPL-SCNC: 4.4 MMOL/L (ref 3.5–5.3)
PROT SERPL-MCNC: 7.6 G/DL (ref 6.4–8.2)
SODIUM SERPL-SCNC: 139 MMOL/L (ref 136–145)

## 2024-09-14 PROCEDURE — 36415 COLL VENOUS BLD VENIPUNCTURE: CPT

## 2024-09-14 PROCEDURE — 80053 COMPREHEN METABOLIC PANEL: CPT

## 2024-09-23 ENCOUNTER — OFFICE VISIT (OUTPATIENT)
Dept: CARDIOLOGY | Facility: HOSPITAL | Age: 74
End: 2024-09-23
Payer: MEDICARE

## 2024-09-23 VITALS
OXYGEN SATURATION: 98 % | BODY MASS INDEX: 41.97 KG/M2 | DIASTOLIC BLOOD PRESSURE: 76 MMHG | SYSTOLIC BLOOD PRESSURE: 111 MMHG | HEART RATE: 98 BPM | HEIGHT: 59 IN | WEIGHT: 208.2 LBS

## 2024-09-23 DIAGNOSIS — I10 PRIMARY HYPERTENSION: Chronic | ICD-10-CM

## 2024-09-23 DIAGNOSIS — E78.5 DYSLIPIDEMIA: Chronic | ICD-10-CM

## 2024-09-23 DIAGNOSIS — I48.91 ATRIAL FIBRILLATION AND FLUTTER: Primary | Chronic | ICD-10-CM

## 2024-09-23 DIAGNOSIS — I48.92 ATRIAL FIBRILLATION AND FLUTTER: Primary | Chronic | ICD-10-CM

## 2024-09-23 PROCEDURE — 93010 ELECTROCARDIOGRAM REPORT: CPT | Performed by: INTERNAL MEDICINE

## 2024-09-23 PROCEDURE — 3008F BODY MASS INDEX DOCD: CPT | Performed by: INTERNAL MEDICINE

## 2024-09-23 PROCEDURE — 99214 OFFICE O/P EST MOD 30 MIN: CPT | Mod: 25 | Performed by: INTERNAL MEDICINE

## 2024-09-23 PROCEDURE — 1036F TOBACCO NON-USER: CPT | Performed by: INTERNAL MEDICINE

## 2024-09-23 PROCEDURE — 93005 ELECTROCARDIOGRAM TRACING: CPT | Performed by: INTERNAL MEDICINE

## 2024-09-23 PROCEDURE — 99214 OFFICE O/P EST MOD 30 MIN: CPT | Performed by: INTERNAL MEDICINE

## 2024-09-23 PROCEDURE — G2211 COMPLEX E/M VISIT ADD ON: HCPCS | Performed by: INTERNAL MEDICINE

## 2024-09-23 PROCEDURE — 3074F SYST BP LT 130 MM HG: CPT | Performed by: INTERNAL MEDICINE

## 2024-09-23 PROCEDURE — 1159F MED LIST DOCD IN RCRD: CPT | Performed by: INTERNAL MEDICINE

## 2024-09-23 PROCEDURE — 3078F DIAST BP <80 MM HG: CPT | Performed by: INTERNAL MEDICINE

## 2024-09-23 NOTE — PROGRESS NOTES
"Chief Complaint:   No chief complaint on file.     History Of Present Illness:    Elias Mayorga is a 74 y.o. female here for follow-up. She has a history of hypertension, hyperlipidemia, OA, CVA, class III obesity, and paroxysmal atrial flutter/fibrillation. She also had a positive stress test in 2020 with an unremarkable subsequent coronary CT angiography.    She reports doing well.  She otherwise reports no complaints. Does not exercise but is planning on starting soon.             Last Recorded Vitals:  Vitals:    09/23/24 1400   BP: 111/76   BP Location: Left arm   Pulse: 98   SpO2: 98%   Weight: 94.4 kg (208 lb 3.2 oz)   Height: 1.499 m (4' 11\")             Allergies:  Penicillins    Outpatient Medications:  Current Outpatient Medications   Medication Instructions    acetaminophen (TYLENOL) 650 mg, oral, Every 6 hours PRN    apixaban (ELIQUIS) 5 mg, oral, Every 12 hours    atorvastatin (LIPITOR) 80 mg, oral, Nightly    CALCIUM CARBONATE-VITAMIN D3 ORAL TAKE AS DIRECTED.    cholecalciferol (Vitamin D-3) 25 MCG (1000 UT) tablet 1 tablet, oral, Daily    coenzyme Q-10 10 mg, oral, Daily    DILT- mg, oral, Daily    dilTIAZem XR (DILACOR XR) 180 mg, oral, Daily    metoprolol succinate XL (TOPROL-XL) 50 mg, oral, Daily, Do not crush or chew.    multivit-iron-minerals-folic acid (Centrum Silver) 0.4 mg-300 mcg- 250 mcg tab 1 tablet, oral, Daily    spironolactone (ALDACTONE) 12.5 mg, oral, Daily         Physical Exam:  Gen Well appearing septuagenarian female sitting up in NAD. Body mass index is 42.05 kg/m².   CV irregularly irregular. No m/r/g appreciated. No JVD or leg edema.   Pulm Lungs clear with normal respiratory effort.  Neuro Alert and conversant. Grossly nonfocal.       I reviewed the patient's ECG -  Atrial flutter with variable block @ 98 bpm.     I reviewed most recent imaging / labs / and office notes.        Assessment/Plan   1. Atrial fibrillation/flutter  Paroxysmal though more persistent as " of late (stage IIIb). Rates acceptable on dual AV blockade. On Eliquis for AC which is to continue indefinitely.    2 Hypertension   Blood pressure well controlled today. Her present regimen is to continue. Exercise encouraged.    3. Dyslipidemia   On statin with last lipid profile acceptable.         Follow-up 12 months          Carlos Montano MD

## 2024-09-29 LAB
ATRIAL RATE: 315 BPM
Q ONSET: 225 MS
QRS COUNT: 16 BEATS
QRS DURATION: 70 MS
QT INTERVAL: 396 MS
QTC CALCULATION(BAZETT): 505 MS
QTC FREDERICIA: 466 MS
R AXIS: 54 DEGREES
T AXIS: -49 DEGREES
T OFFSET: 423 MS
VENTRICULAR RATE: 98 BPM

## 2024-10-02 DIAGNOSIS — I10 HTN (HYPERTENSION), BENIGN: ICD-10-CM

## 2024-10-03 RX ORDER — DILTIAZEM HYDROCHLORIDE 180 MG/1
CAPSULE, EXTENDED RELEASE ORAL
Qty: 90 CAPSULE | Refills: 3 | Status: SHIPPED | OUTPATIENT
Start: 2024-10-03

## 2024-10-22 ENCOUNTER — APPOINTMENT (OUTPATIENT)
Dept: PRIMARY CARE | Facility: CLINIC | Age: 74
End: 2024-10-22
Payer: MEDICARE

## 2024-10-22 VITALS — WEIGHT: 207.6 LBS | BODY MASS INDEX: 41.93 KG/M2 | TEMPERATURE: 98.4 F

## 2024-10-22 DIAGNOSIS — E78.5 DYSLIPIDEMIA: ICD-10-CM

## 2024-10-22 DIAGNOSIS — Z23 FLU VACCINE NEED: ICD-10-CM

## 2024-10-22 DIAGNOSIS — I10 PRIMARY HYPERTENSION: Primary | ICD-10-CM

## 2024-10-22 DIAGNOSIS — I48.92 ATRIAL FIBRILLATION AND FLUTTER: ICD-10-CM

## 2024-10-22 DIAGNOSIS — I48.91 ATRIAL FIBRILLATION AND FLUTTER: ICD-10-CM

## 2024-10-22 PROCEDURE — 1160F RVW MEDS BY RX/DR IN RCRD: CPT | Performed by: INTERNAL MEDICINE

## 2024-10-22 PROCEDURE — 90471 IMMUNIZATION ADMIN: CPT | Performed by: INTERNAL MEDICINE

## 2024-10-22 PROCEDURE — 99214 OFFICE O/P EST MOD 30 MIN: CPT | Performed by: INTERNAL MEDICINE

## 2024-10-22 PROCEDURE — 90662 IIV NO PRSV INCREASED AG IM: CPT | Performed by: INTERNAL MEDICINE

## 2024-10-22 PROCEDURE — 1159F MED LIST DOCD IN RCRD: CPT | Performed by: INTERNAL MEDICINE

## 2024-10-22 NOTE — PROGRESS NOTES
Elias Mayorga is a 74 y.o. female   Patient with a past medical history of CVA, A fibrillation, HTN, HLD, Pedal edema,Shingles, CKD III, Colonoscopy due 2029, Mammograms with [facility]      Feels fine  No chest pain/  SOB/ dizziness  BM OK  Energy level ok  Appetite OK             Review of Systems     Constitutional: no fever, no chills, not feeling poorly, not feeling tired and no recent weight gain, no recent weight loss.   ENT: no earache, no hearing loss, no nosebleeds, no nasal discharge, no sore throat and no hoarseness.   Cardiovascular: the heart rate was not slow, the heart rate was not fast, no chest pain, no palpitations, no intermittent leg claudication and no lower extremity edema.   Respiratory: no cough, wheezing or shortness of breath at rest or exertion  Gastrointestinal: no abdominal pain, no constipation, no melena, no nausea, no diarrhea, no vomiting and no blood in stools.   Musculoskeletal: no arthralgias, no myalgias, no back pain, no joint swelling, no joint stiffness, no limb pain and no limb swelling.   Integumentary: no skin rashes, no skin lesions, no itching, no skin wound and no dry skin.   Neurological: no headache, no confusion, no numbness, no dizziness, no tingling and no fainting.   All other systems have been reviewed and are negative for complaint.     Current Outpatient Medications   Medication Instructions    acetaminophen (TYLENOL) 650 mg, oral, Every 6 hours PRN    apixaban (ELIQUIS) 5 mg, oral, Every 12 hours    atorvastatin (LIPITOR) 80 mg, oral, Nightly    CALCIUM CARBONATE-VITAMIN D3 ORAL TAKE AS DIRECTED.    cholecalciferol (Vitamin D-3) 25 MCG (1000 UT) tablet 1 tablet, oral, Daily    coenzyme Q-10 10 mg, oral, Daily    DILT- mg 24 hr capsule TAKE 1 CAPSULE(180 MG) BY MOUTH ONCE DAILY    DILT- mg, oral, Daily    metoprolol succinate XL (TOPROL-XL) 50 mg, oral, Daily, Do not crush or chew.    multivit-iron-minerals-folic acid (Centrum Silver) 0.4 mg-300 mcg-  250 mcg tab 1 tablet, oral, Daily    spironolactone (ALDACTONE) 12.5 mg, oral, Daily         There were no vitals filed for this visit.     Physical Exam     Constitutional   General appearance: Alert and in no acute distress.     Pulmonary   Respiratory assessment: No respiratory distress, normal respiratory rhythm and effort.    Auscultation of Lungs: Clear bilateral breath sounds.   Cardiovascular   Auscultation of heart: Irregularly Irregular  Exam for edema:  1 plus peripheral edema.   Abdomen   Abdominal Exam: No bruits, normal bowel sounds, soft, non-tender, no abdominal mass palpated.    Liver and Spleen exam: No hepato-splenomegaly.   Musculoskeletal   Examination of gait: Normal.    Inspection of digits and nails: No clubbing or cyanosis of the fingernails.    Inspection/palpation of joints, bones and muscles: No joint swelling. Normal movement of all extremities.   Skin   Skin inspection: Normal skin color and pigmentation, normal skin turgor and no visible rash.   Neurologic   Cranial nerves: Nerves 2-12 were intact, no focal neuro defects.    Assessment/Plan          Patient with a past medical history of CVA, A fibrillation, HTN, HLD, Pedal edema,Shingles, CKD III, Colonoscopy due 2029, Mammograms with [facility]      # HTN/ CKD III  Stable  Continue current medications     # Persistent  A fib  Rate controlled  Continue AC     # HLD  Stable  Continue current medications  Watch salt, avoid excessive caffeine  Avoid processed meats/ sugars/juices Instead eat fresh fruit  Add walnuts and almonds to your diet  exercise 6 days a week for 30 minutes          # Hx of CVA(2018)  Stable  Continue statins / Eliquis     # Morbid Obesity  Diet/ 1600 zulay/ exercise  Walk 30 minutes every day

## 2024-12-10 DIAGNOSIS — I48.19 PERSISTENT ATRIAL FIBRILLATION (MULTI): ICD-10-CM

## 2024-12-10 RX ORDER — APIXABAN 5 MG/1
TABLET, FILM COATED ORAL
Qty: 180 TABLET | Refills: 0 | Status: SHIPPED | OUTPATIENT
Start: 2024-12-10

## 2024-12-16 DIAGNOSIS — I48.19 PERSISTENT ATRIAL FIBRILLATION (MULTI): ICD-10-CM

## 2024-12-16 RX ORDER — METOPROLOL SUCCINATE 50 MG/1
50 TABLET, EXTENDED RELEASE ORAL DAILY
Qty: 90 TABLET | Refills: 3 | Status: SHIPPED | OUTPATIENT
Start: 2024-12-16 | End: 2025-12-16

## 2025-01-12 DIAGNOSIS — I10 HTN (HYPERTENSION), BENIGN: ICD-10-CM

## 2025-01-13 RX ORDER — DILTIAZEM HYDROCHLORIDE 180 MG/1
CAPSULE, EXTENDED RELEASE ORAL
Qty: 30 CAPSULE | Refills: 3 | Status: SHIPPED | OUTPATIENT
Start: 2025-01-13

## 2025-01-24 ENCOUNTER — APPOINTMENT (OUTPATIENT)
Dept: PRIMARY CARE | Facility: CLINIC | Age: 75
End: 2025-01-24
Payer: MEDICARE

## 2025-03-13 DIAGNOSIS — I48.19 PERSISTENT ATRIAL FIBRILLATION (MULTI): ICD-10-CM

## 2025-03-13 RX ORDER — APIXABAN 5 MG/1
TABLET, FILM COATED ORAL
Qty: 180 TABLET | Refills: 0 | Status: SHIPPED | OUTPATIENT
Start: 2025-03-13

## 2025-04-27 DIAGNOSIS — I10 PRIMARY HYPERTENSION: ICD-10-CM

## 2025-04-29 RX ORDER — SPIRONOLACTONE 25 MG/1
TABLET ORAL
Qty: 45 TABLET | Refills: 3 | Status: SHIPPED | OUTPATIENT
Start: 2025-04-29

## 2025-07-12 DIAGNOSIS — I48.19 PERSISTENT ATRIAL FIBRILLATION (MULTI): ICD-10-CM

## 2025-07-12 DIAGNOSIS — E78.5 DYSLIPIDEMIA: ICD-10-CM

## 2025-07-28 DIAGNOSIS — E78.5 DYSLIPIDEMIA: ICD-10-CM

## 2025-07-28 RX ORDER — ATORVASTATIN CALCIUM 80 MG/1
TABLET, FILM COATED ORAL
Qty: 90 TABLET | Refills: 3 | Status: SHIPPED | OUTPATIENT
Start: 2025-07-28

## 2025-07-28 RX ORDER — APIXABAN 5 MG/1
TABLET, FILM COATED ORAL
Qty: 180 TABLET | Refills: 0 | Status: SHIPPED | OUTPATIENT
Start: 2025-07-28

## 2025-07-28 RX ORDER — ATORVASTATIN CALCIUM 80 MG/1
TABLET, FILM COATED ORAL
Qty: 90 TABLET | Refills: 3 | Status: SHIPPED | OUTPATIENT
Start: 2025-07-28 | End: 2025-07-28

## 2025-09-24 ENCOUNTER — APPOINTMENT (OUTPATIENT)
Dept: CARDIOLOGY | Facility: HOSPITAL | Age: 75
End: 2025-09-24
Payer: MEDICARE